# Patient Record
Sex: FEMALE | Race: BLACK OR AFRICAN AMERICAN | Employment: FULL TIME | URBAN - METROPOLITAN AREA
[De-identification: names, ages, dates, MRNs, and addresses within clinical notes are randomized per-mention and may not be internally consistent; named-entity substitution may affect disease eponyms.]

---

## 2017-01-05 ENCOUNTER — HOSPITAL ENCOUNTER (OUTPATIENT)
Dept: MAMMOGRAPHY | Age: 52
Discharge: HOME | End: 2017-01-05
Attending: FAMILY MEDICINE | Admitting: FAMILY MEDICINE

## 2017-01-05 DIAGNOSIS — Z12.31 ENCOUNTER FOR SCREENING MAMMOGRAM FOR BREAST CANCER: ICD-10-CM

## 2017-03-13 LAB
HPV COMMENT: NORMAL
HPV TYPE 16: NOT DETECTED
HPV TYPE 18: NOT DETECTED
HPVOH (OTHER TYPES): NOT DETECTED

## 2018-05-08 ENCOUNTER — HOSPITAL ENCOUNTER (OUTPATIENT)
Dept: ENDOSCOPY | Age: 53
Discharge: HOME | End: 2018-05-08
Attending: SURGERY | Admitting: SURGERY

## 2018-05-08 VITALS
RESPIRATION RATE: 15 BRPM | HEIGHT: 66 IN | DIASTOLIC BLOOD PRESSURE: 89 MMHG | HEART RATE: 95 BPM | WEIGHT: 239 LBS | SYSTOLIC BLOOD PRESSURE: 114 MMHG | OXYGEN SATURATION: 100 % | TEMPERATURE: 98.3 F | BODY MASS INDEX: 38.41 KG/M2

## 2018-05-08 LAB
GLUCOSE BLD-MCNC: 145 MG/DL (ref 70–99)
GLUCOSE BLD-MCNC: 177 MG/DL (ref 70–99)
HCG(URINE) PREGNANCY TEST: NEGATIVE
PERFORMED ON: ABNORMAL
PERFORMED ON: ABNORMAL

## 2018-05-08 RX ORDER — SODIUM CHLORIDE 0.9 % (FLUSH) 0.9 %
10 SYRINGE (ML) INJECTION PRN
Status: DISCONTINUED | OUTPATIENT
Start: 2018-05-08 | End: 2018-05-09 | Stop reason: HOSPADM

## 2018-05-08 RX ORDER — SODIUM CHLORIDE 9 MG/ML
INJECTION, SOLUTION INTRAVENOUS CONTINUOUS
Status: DISCONTINUED | OUTPATIENT
Start: 2018-05-08 | End: 2018-05-09 | Stop reason: HOSPADM

## 2018-05-08 RX ORDER — SODIUM CHLORIDE 0.9 % (FLUSH) 0.9 %
10 SYRINGE (ML) INJECTION EVERY 12 HOURS SCHEDULED
Status: DISCONTINUED | OUTPATIENT
Start: 2018-05-08 | End: 2018-05-09 | Stop reason: HOSPADM

## 2018-05-08 RX ORDER — IBUPROFEN 400 MG/1
400 TABLET ORAL EVERY 6 HOURS PRN
COMMUNITY
End: 2021-07-12

## 2018-05-08 RX ORDER — AMOXICILLIN 500 MG/1
500 CAPSULE ORAL 3 TIMES DAILY
COMMUNITY
End: 2021-07-12

## 2018-05-08 RX ADMIN — SODIUM CHLORIDE: 9 INJECTION, SOLUTION INTRAVENOUS at 11:53

## 2018-05-08 ASSESSMENT — PAIN DESCRIPTION - DESCRIPTORS: DESCRIPTORS: ACHING

## 2018-05-08 ASSESSMENT — ENCOUNTER SYMPTOMS: SHORTNESS OF BREATH: 0

## 2018-05-08 ASSESSMENT — PAIN - FUNCTIONAL ASSESSMENT: PAIN_FUNCTIONAL_ASSESSMENT: 0-10

## 2018-06-28 ENCOUNTER — HOSPITAL ENCOUNTER (OUTPATIENT)
Dept: MAMMOGRAPHY | Age: 53
Discharge: HOME | End: 2018-06-28
Attending: OBSTETRICS & GYNECOLOGY | Admitting: OBSTETRICS & GYNECOLOGY

## 2018-06-28 DIAGNOSIS — Z12.31 ENCOUNTER FOR SCREENING MAMMOGRAM FOR BREAST CANCER: ICD-10-CM

## 2018-07-13 ENCOUNTER — HOSPITAL ENCOUNTER (OUTPATIENT)
Dept: ULTRASOUND IMAGING | Age: 53
Discharge: HOME | End: 2018-07-13
Attending: FAMILY MEDICINE | Admitting: FAMILY MEDICINE

## 2018-07-13 DIAGNOSIS — E04.9 NONTOXIC GOITER: ICD-10-CM

## 2018-07-13 DIAGNOSIS — E04.9 NON-TOXIC GOITER: ICD-10-CM

## 2018-09-05 ENCOUNTER — HOSPITAL ENCOUNTER (OUTPATIENT)
Dept: ULTRASOUND IMAGING | Age: 53
Discharge: HOME | End: 2018-09-05
Admitting: FAMILY MEDICINE

## 2018-09-05 DIAGNOSIS — R10.2 PELVIC AND PERINEAL PAIN: ICD-10-CM

## 2018-09-05 DIAGNOSIS — R10.2 PELVIC PAIN: ICD-10-CM

## 2020-01-31 ENCOUNTER — HOSPITAL ENCOUNTER (OUTPATIENT)
Dept: GENERAL RADIOLOGY | Age: 55
Discharge: HOME | End: 2020-01-31
Payer: COMMERCIAL

## 2020-01-31 PROCEDURE — 74220 X-RAY XM ESOPHAGUS 1CNTRST: CPT

## 2020-11-05 ENCOUNTER — HOSPITAL ENCOUNTER (OUTPATIENT)
Dept: CT IMAGING | Age: 55
Discharge: HOME | End: 2020-11-05
Payer: COMMERCIAL

## 2020-11-05 LAB
CREAT SERPL-MCNC: 0.7 MG/DL (ref 0.6–1.1)
GFR AFRICAN AMERICAN: >60
GFR NON-AFRICAN AMERICAN: >60

## 2020-11-05 PROCEDURE — 36415 COLL VENOUS BLD VENIPUNCTURE: CPT

## 2020-11-05 PROCEDURE — 82565 ASSAY OF CREATININE: CPT

## 2020-11-05 PROCEDURE — 6360000004 HC RX CONTRAST MEDICATION: Performed by: OBSTETRICS & GYNECOLOGY

## 2020-11-05 PROCEDURE — 72193 CT PELVIS W/DYE: CPT

## 2020-11-05 RX ADMIN — IOPAMIDOL 75 ML: 755 INJECTION, SOLUTION INTRAVENOUS at 14:47

## 2020-11-05 RX ADMIN — IOHEXOL 50 ML: 240 INJECTION, SOLUTION INTRATHECAL; INTRAVASCULAR; INTRAVENOUS; ORAL at 14:47

## 2020-11-17 LAB — CA 125: 192.1 U/ML (ref 0–35)

## 2020-11-19 LAB — CEA: 2.3 NG/ML (ref 0–5)

## 2020-12-11 ENCOUNTER — HOSPITAL ENCOUNTER (OUTPATIENT)
Dept: VASCULAR LAB | Age: 55
Discharge: HOME | End: 2020-12-11
Payer: COMMERCIAL

## 2020-12-11 PROCEDURE — 93971 EXTREMITY STUDY: CPT

## 2021-05-04 ENCOUNTER — TELEPHONE (OUTPATIENT)
Dept: ENDOCRINOLOGY | Age: 56
End: 2021-05-04

## 2021-07-12 ENCOUNTER — VIRTUAL VISIT (OUTPATIENT)
Dept: ENDOCRINOLOGY | Age: 56
End: 2021-07-12
Payer: COMMERCIAL

## 2021-07-12 DIAGNOSIS — E11.9 TYPE 2 DIABETES MELLITUS WITHOUT COMPLICATION, WITHOUT LONG-TERM CURRENT USE OF INSULIN (HCC): Primary | ICD-10-CM

## 2021-07-12 DIAGNOSIS — I10 ESSENTIAL HYPERTENSION: ICD-10-CM

## 2021-07-12 DIAGNOSIS — C56.3 MALIGNANT NEOPLASM OF BOTH OVARIES (HCC): ICD-10-CM

## 2021-07-12 DIAGNOSIS — K21.00 GASTROESOPHAGEAL REFLUX DISEASE WITH ESOPHAGITIS WITHOUT HEMORRHAGE: ICD-10-CM

## 2021-07-12 PROCEDURE — 99243 OFF/OP CNSLTJ NEW/EST LOW 30: CPT | Performed by: INTERNAL MEDICINE

## 2021-07-12 PROCEDURE — G8427 DOCREV CUR MEDS BY ELIG CLIN: HCPCS | Performed by: INTERNAL MEDICINE

## 2021-07-12 PROCEDURE — G8421 BMI NOT CALCULATED: HCPCS | Performed by: INTERNAL MEDICINE

## 2021-07-12 PROCEDURE — 2022F DILAT RTA XM EVC RTNOPTHY: CPT | Performed by: INTERNAL MEDICINE

## 2021-07-12 RX ORDER — BLOOD SUGAR DIAGNOSTIC
STRIP MISCELLANEOUS
COMMUNITY
Start: 2021-05-01

## 2021-07-12 RX ORDER — FLUTICASONE PROPIONATE 50 MCG
1 SPRAY, SUSPENSION (ML) NASAL DAILY PRN
COMMUNITY
Start: 2020-10-28 | End: 2022-09-29

## 2021-07-12 NOTE — Clinical Note
Please email ( [email or web address]) and mail her lab orders at schedule for a follow up in 3 months and mail patient  lab orders

## 2021-07-12 NOTE — PROGRESS NOTES
Hamzah Butler is a 54 y.o. female is referred to me by Dr Stephanie Pulido for evaluation and management of uncontrolled Type  2 diabetes. Hamzah Butler was diagnosed with Diabetes mellitus in 2009  . Diabetes was diagnosed at routine screening . Hamzah Butler got diabetic education in the past.  Comorbid conditions: Neuropathy  She has been doing Keto diet. She stopped taking Metformin and Glyburide since she lost 20 lbs from 2020 to 2021     She is also diagnosed with stage 3 Ovarian cancer in 2020 she took Chemo and had hysterectomy and oophorectomy.    She also has been diagnosed with tachycardia   She has some neuropathy symptoms in her left leg   She has sleep Apnea   She has hypertension and is on HCTZ         Past Medical History:   Diagnosis Date    Diabetes mellitus ([address]     GERD (gastroesophageal reflux disease)     Hiatal hernia     Hypertension     Malignant neoplasm of both ovaries ([address] 2021    Morbid obesity with BMI of 40.0-44.9, adult [facility])       Patient Active Problem List   Diagnosis    Diabetes mellitus ([address]    Hiatal hernia    Essential hypertension    Morbid obesity with BMI of 40.0-44.9, adult ([address]    GERD (gastroesophageal reflux disease)    Malignant neoplasm of both ovaries ([address]     Past Surgical History:   Procedure Laterality Date     SECTION      1    CHOLECYSTECTOMY      COLONOSCOPY  2018    Colonoscopy up to cecum with biopsy polypectomy of rectum annd snare polypectomy of hepatic flexure    WISDOM TOOTH EXTRACTION       Social History     Socioeconomic History    Marital status: Single     Spouse name: Not on file    Number of children: Not on file    Years of education: Not on file    Highest education level: Not on file   Occupational History    Not on file   Tobacco Use    Smoking status: Never Smoker    Smokeless tobacco: Never Used   Substance and Sexual Activity    Alcohol use: No     Alcohol/week: 0.0 standard drinks    Drug use: No    Sexual activity: Not on file   Other Topics Concern    Not on file   Social History Narrative    Not on file     Social Determinants of Health     Financial Resource Strain:     Difficulty of Paying Living Expenses:    Food Insecurity:     Worried About Running Out of Food in the Last Year:     [address] in the Last Year:    Transportation Needs:     Lack of Transportation (Medical):  Lack of Transportation (Non-Medical):    Physical Activity:     Days of Exercise per Week:     Minutes of Exercise per Session:    Stress:     Feeling of Stress :    Social Connections:     Frequency of Communication with Friends and Family:     Frequency of Social Gatherings with Friends and Family:     Attends [employer] Services:     Active Member of Clubs or Organizations:     Attends Club or Organization Meetings:     Marital Status:    Intimate Partner Violence:     Fear of Current or Ex-Partner:     Emotionally Abused:     Physically Abused:     Sexually Abused:      Family History   Problem Relation Age of Onset    High Blood Pressure Mother     Heart Disease Father         passed at 61    Cancer Maternal Uncle         breast     Current Outpatient Medications   Medication Sig Dispense Refill    fluticasone (FLONASE) 50 MCG/ACT nasal spray 1 spray by Nasal route daily as needed      ONETOUCH VERIO strip       hydrochlorothiazide (HYDRODIURIL) 25 MG tablet Take 25 mg by mouth daily       lisinopril (PRINIVIL;ZESTRIL) 40 MG tablet Take 40 mg by mouth daily       metFORMIN (GLUCOPHAGE) 1000 MG tablet Take 1,000 mg by mouth 2 times daily (with meals). (Patient not taking: Reported on 7/12/2021)      glyBURIDE (DIABETA) 5 MG tablet Take 5 mg by mouth 2 times daily (with meals). Unsure of dose (Patient not taking: Reported on 7/12/2021)       No current facility-administered medications for this visit.      Allergies   Allergen Reactions    Augmentin [Amoxicillin-Pot Clavulanate]      STOMACH UPSET     Family Status   Relation Name Status    Mother  Alive    Father      MUnc  Alive         OBJECTIVE:  There were no vitals taken for this visit. Wt Readings from Last 3 Encounters:   18 239 lb (108.4 kg)   18 239 lb (108.4 kg)   18 237 lb (107.5 kg)       Constitutional: no acute distress, well appearing and well nourished  Psychiatric: oriented to person, place and time, judgement and insight and normal, recent and remote memory intact and mood and affect are normal  Skin: skin and subcutaneous tissue is normal without visible mass,   Head and Face: visual inspection  of head and face revealed no abnormalities  Eyes: visual inspection showed no lid or conjunctival swelling, erythema or discharge, pupils are normal, equal, round  Ears/Nose: external inspection of ears and nose revealed no abnormalities, hearing is grossly normal  Oropharynx/Mouth/Face: lips, tongue and gums appear  normal with no lesions, the voice quality was normal  Neck: neck appears symmetric, with no visible masses,   Pulmonary: no increased work of breathing or signs of respiratory distress,  Musculoskeletal: normal on inspection    Neurological: normal coordination and normal general cortical function      No results found for: LABA1C      ASSESSMENT/PLAN:    1. Type 2 diabetes mellitus without complication, without long-term current use of insulin       We discussed progression of diabetes in detail and also the incidence of complications associated with uncontrolled diabetes. Gurpreet Gabriel was advised that lifestyle modification is the key to better control of her Diabetes. We discussed carbohydrate restriction in detail.   She has been restricting her Carbs and was able to stop both her oral hypoglycemic agents in 2021 ( she was on metformin and Glyburide )   Metformin also gave her Gastric  SE anyways      Health Maintenance   Last Eye Exam: advised to have annual dilated eye exam. her last eye exam was in July 2021 showed  retinopathy   Last Podiatry Exam:  Last foot exam was with podiatry   Lipids: Last LDL level was not available   Microalbumin/Creatinine Ratio: I have ordered MA with next lab work     . Education: Reviewed ABCs of diabetes management (respective goals in parentheses): A1C (<7), blood pressure (<130/80), and cholesterol (LDL <100). Additional Education: referred to Diabetes Educator.      - Comprehensive Metabolic Panel; Future  - Hemoglobin A1C; Future  - Lipid Panel; Future  - Microalbumin / Creatinine Urine Ratio; Future  - TSH without Reflex; Future  - Comprehensive Metabolic Panel; Future  - Hemoglobin A1C; Future  - Microalbumin / Creatinine Urine Ratio; Future  - TSH without Reflex; Future    2. Essential hypertension  She is on HCTZ     3. Malignant neoplasm of both ovaries [facility])  She is s/p complete hysterectomy   She is on Chemo     4. Gastroesophageal reflux disease with esophagitis without hemorrhage  Stable       Reviewed and/or ordered clinical lab results yes   Reviewed and/or ordered radiology tests Yes  Reviewed and/or ordered other diagnostic tests yes   Made a decision to obtain old records yes   Reviewed and summarized old records yes     Caridad Gonzales was counseled regarding symptoms of current diagnosis, course and complications of disease if inadequately treated, side effects of medications, diagnosis, treatment options, and prognosis, risks, benefits, complications, and alternatives of treatment, labs, imaging and other studies and treatment targets and goals.   She understands instructions and counseling    TELEHEALTH EVALUATION -- Audio/Visual (During XPQQX-22 public health emergency)  Pursuant to the emergency declaration under the [facility], [facility] waiver authority and the [employer] Act, this Virtual  Visit was conducted, with patient's consent, to reduce the patient's risk of exposure to COVID-19 and provide care for  patient. Services were provided through a video synchronous discussion virtually to substitute for in-person clinic visit. Patient's location : home     Patient provided verbal consent to use the video visit. Total time spent : 40 + Reviewing the chart, conducting an interview, performing a limited exam by video and educating the patient on my assessment plan. Return in about 5 months (around 12/12/2021). Please note that some or all of this report was generated using voice recognition software. Please notify me in case of any questions about the content of this document, as some errors in transcription may have occurred .

## 2021-07-15 ENCOUNTER — TELEPHONE (OUTPATIENT)
Dept: ENDOCRINOLOGY | Age: 56
End: 2021-07-15

## 2021-07-15 NOTE — TELEPHONE ENCOUNTER
Pt is still awaiting her lab orders to be emailed her I had routed those on Monday to [place] as pt is wanting to get blood work done and she called back to day for lab orders

## 2021-07-15 NOTE — TELEPHONE ENCOUNTER
Pt phoned having problems with her both legs. She would like a call back. She has a lot of concerns.

## 2021-07-16 ENCOUNTER — TELEPHONE (OUTPATIENT)
Dept: ENDOCRINOLOGY | Age: 56
End: 2021-07-16

## 2021-07-16 NOTE — TELEPHONE ENCOUNTER
Yes , I did email the lab orders & mailed them out to her in the mail as well.  Our mail is picked up by a  and then sent from the home office in [place] so there is a turn around for our mail by the time it reaches their home address

## 2021-07-16 NOTE — TELEPHONE ENCOUNTER
Email from patient:    You guys have my insurance mixed up. My primary insurance is [employer]. My secondary insurance is [employer]. Please update in the computer to ensure proper billing for  payment of claims     Izzy Green can you see if you can fix this?

## 2021-07-16 NOTE — TELEPHONE ENCOUNTER
Poly Garcia fixed her insurance.  Not sure how it got switched but when I registered her I had [place] as Primary and her [facility] Medicaid as 2ndary because all medicaid plans are always 2ndary w another insurance

## 2021-07-17 ENCOUNTER — HOSPITAL ENCOUNTER (OUTPATIENT)
Age: 56
Discharge: HOME | End: 2021-07-17
Payer: COMMERCIAL

## 2021-07-17 DIAGNOSIS — E11.9 TYPE 2 DIABETES MELLITUS WITHOUT COMPLICATION, WITHOUT LONG-TERM CURRENT USE OF INSULIN (HCC): ICD-10-CM

## 2021-07-17 LAB
A/G RATIO: 1.2 (ref 1.1–2.2)
ALBUMIN SERPL-MCNC: 4.3 G/DL (ref 3.4–5)
ALP BLD-CCNC: 83 U/L (ref 40–129)
ALT SERPL-CCNC: 18 U/L (ref 10–40)
ANION GAP SERPL CALCULATED.3IONS-SCNC: 13 MMOL/L (ref 3–16)
AST SERPL-CCNC: 21 U/L (ref 15–37)
BILIRUB SERPL-MCNC: 0.3 MG/DL (ref 0–1)
BUN BLDV-MCNC: 17 MG/DL (ref 7–20)
CALCIUM SERPL-MCNC: 9.6 MG/DL (ref 8.3–10.6)
CHLORIDE BLD-SCNC: 97 MMOL/L (ref 99–110)
CHOLESTEROL, TOTAL: 151 MG/DL (ref 0–199)
CO2: 27 MMOL/L (ref 21–32)
CREAT SERPL-MCNC: 0.7 MG/DL (ref 0.6–1.1)
CREATININE URINE: 54.2 MG/DL (ref 28–259)
GFR AFRICAN AMERICAN: >60
GFR NON-AFRICAN AMERICAN: >60
GLOBULIN: 3.5 G/DL
GLUCOSE BLD-MCNC: 80 MG/DL (ref 70–99)
HDLC SERPL-MCNC: 48 MG/DL (ref 40–60)
LDL CHOLESTEROL CALCULATED: 95 MG/DL
MICROALBUMIN UR-MCNC: <1.2 MG/DL
MICROALBUMIN/CREAT UR-RTO: NORMAL MG/G (ref 0–30)
POTASSIUM SERPL-SCNC: 4 MMOL/L (ref 3.5–5.1)
SODIUM BLD-SCNC: 137 MMOL/L (ref 136–145)
TOTAL PROTEIN: 7.8 G/DL (ref 6.4–8.2)
TRIGL SERPL-MCNC: 38 MG/DL (ref 0–150)
TSH SERPL DL<=0.05 MIU/L-ACNC: 0.46 UIU/ML (ref 0.27–4.2)
VLDLC SERPL CALC-MCNC: 8 MG/DL

## 2021-07-17 PROCEDURE — 83036 HEMOGLOBIN GLYCOSYLATED A1C: CPT

## 2021-07-17 PROCEDURE — 82043 UR ALBUMIN QUANTITATIVE: CPT

## 2021-07-17 PROCEDURE — 82570 ASSAY OF URINE CREATININE: CPT

## 2021-07-17 PROCEDURE — 80061 LIPID PANEL: CPT

## 2021-07-17 PROCEDURE — 80053 COMPREHEN METABOLIC PANEL: CPT

## 2021-07-17 PROCEDURE — 36415 COLL VENOUS BLD VENIPUNCTURE: CPT

## 2021-07-17 PROCEDURE — 84443 ASSAY THYROID STIM HORMONE: CPT

## 2021-07-18 LAB
ESTIMATED AVERAGE GLUCOSE: 131.2 MG/DL
HBA1C MFR BLD: 6.2 %

## 2021-07-21 DIAGNOSIS — E11.9 TYPE 2 DIABETES MELLITUS WITHOUT COMPLICATION, WITHOUT LONG-TERM CURRENT USE OF INSULIN (HCC): Primary | ICD-10-CM

## 2021-09-14 ENCOUNTER — TELEPHONE (OUTPATIENT)
Dept: ENDOCRINOLOGY | Age: 56
End: 2021-09-14

## 2021-10-05 ENCOUNTER — TELEPHONE (OUTPATIENT)
Dept: ENDOCRINOLOGY | Age: 56
End: 2021-10-05

## 2021-10-05 DIAGNOSIS — E04.1 THYROID NODULE: Primary | ICD-10-CM

## 2021-10-05 NOTE — TELEPHONE ENCOUNTER
I do not have her most recent results and have tried to update the chart once results are available I will call her back

## 2021-10-05 NOTE — TELEPHONE ENCOUNTER
Pt is calling she has some questions regarding test results she had done at [facility]

## 2021-10-06 NOTE — TELEPHONE ENCOUNTER
Patient aware. She states she had a doppler done through [facility] that showed thyroid nodules and that is what she meant for you to look at.

## 2021-10-06 NOTE — TELEPHONE ENCOUNTER
Please advise patient I reviewed the report of venous duplex and it seems like they found incidental small thyroid nodules I would like her to get a detailed thyroid ultrasound to better quantify the size of these nodules I have already placed the order she can get it done in the next week or so.

## 2021-10-22 ENCOUNTER — HOSPITAL ENCOUNTER (OUTPATIENT)
Dept: ULTRASOUND IMAGING | Age: 56
Discharge: HOME | End: 2021-10-22
Payer: COMMERCIAL

## 2021-10-22 DIAGNOSIS — E04.1 THYROID NODULE: ICD-10-CM

## 2021-10-22 PROCEDURE — 76536 US EXAM OF HEAD AND NECK: CPT

## 2021-10-28 ENCOUNTER — TELEPHONE (OUTPATIENT)
Dept: ENDOCRINOLOGY | Age: 56
End: 2021-10-28

## 2021-10-28 NOTE — TELEPHONE ENCOUNTER
----- Message from Claudette Bourne MD sent at 10/27/2021  1:46 PM EDT -----  Please advise patient that thyroid ultrasound report was reviewed she has a small cyst in the left thyroid lobe which does not look suspicious I would recommend repeating a thyroid ultrasound in 1 year

## 2021-10-28 NOTE — TELEPHONE ENCOUNTER
Patient had an upper extremity venous duplex scanWhich did not show any evidence of deep vein thrombosis in the left upper extremity  But it did show incidentally thyroid nodules measuring approximately 0.5 cm  Patient underwent neck ultrasound on October 21, 2021 which showed a small cystic nodule in the left thyroid lobe   I called this patient multiple times on her provided number and it kept on going to her voicemail left a detailed message

## 2022-04-22 ENCOUNTER — TELEPHONE (OUTPATIENT)
Dept: ENDOCRINOLOGY | Age: 57
End: 2022-04-22

## 2022-09-26 ENCOUNTER — TELEPHONE (OUTPATIENT)
Dept: ENDOCRINOLOGY | Age: 57
End: 2022-09-26

## 2022-09-26 DIAGNOSIS — E11.9 TYPE 2 DIABETES MELLITUS WITHOUT COMPLICATION, WITHOUT LONG-TERM CURRENT USE OF INSULIN (HCC): Primary | ICD-10-CM

## 2022-09-28 ENCOUNTER — HOSPITAL ENCOUNTER (OUTPATIENT)
Age: 57
Discharge: HOME | End: 2022-09-28
Payer: COMMERCIAL

## 2022-09-28 DIAGNOSIS — E11.9 TYPE 2 DIABETES MELLITUS WITHOUT COMPLICATION, WITHOUT LONG-TERM CURRENT USE OF INSULIN (HCC): ICD-10-CM

## 2022-09-28 LAB
A/G RATIO: 1.1 (ref 1.1–2.2)
ALBUMIN SERPL-MCNC: 3.9 G/DL (ref 3.4–5)
ALP BLD-CCNC: 69 U/L (ref 40–129)
ALT SERPL-CCNC: 33 U/L (ref 10–40)
ANION GAP SERPL CALCULATED.3IONS-SCNC: 13 MMOL/L (ref 3–16)
AST SERPL-CCNC: 34 U/L (ref 15–37)
BILIRUB SERPL-MCNC: 0.3 MG/DL (ref 0–1)
BUN BLDV-MCNC: 31 MG/DL (ref 7–20)
CALCIUM SERPL-MCNC: 9.9 MG/DL (ref 8.3–10.6)
CHLORIDE BLD-SCNC: 98 MMOL/L (ref 99–110)
CHOLESTEROL, TOTAL: 152 MG/DL (ref 0–199)
CO2: 23 MMOL/L (ref 21–32)
CREAT SERPL-MCNC: 0.8 MG/DL (ref 0.6–1.1)
CREATININE URINE: 106.2 MG/DL (ref 28–259)
GFR AFRICAN AMERICAN: >60
GFR NON-AFRICAN AMERICAN: >60
GLUCOSE BLD-MCNC: 90 MG/DL (ref 70–99)
HDLC SERPL-MCNC: 67 MG/DL (ref 40–60)
LDL CHOLESTEROL CALCULATED: 72 MG/DL
MICROALBUMIN UR-MCNC: <1.2 MG/DL
MICROALBUMIN/CREAT UR-RTO: NORMAL MG/G (ref 0–30)
POTASSIUM SERPL-SCNC: 4.3 MMOL/L (ref 3.5–5.1)
SODIUM BLD-SCNC: 134 MMOL/L (ref 136–145)
TOTAL PROTEIN: 7.4 G/DL (ref 6.4–8.2)
TRIGL SERPL-MCNC: 64 MG/DL (ref 0–150)
TSH SERPL DL<=0.05 MIU/L-ACNC: 1.78 UIU/ML (ref 0.27–4.2)
VLDLC SERPL CALC-MCNC: 13 MG/DL

## 2022-09-28 PROCEDURE — 36415 COLL VENOUS BLD VENIPUNCTURE: CPT

## 2022-09-28 PROCEDURE — 82043 UR ALBUMIN QUANTITATIVE: CPT

## 2022-09-28 PROCEDURE — 80053 COMPREHEN METABOLIC PANEL: CPT

## 2022-09-28 PROCEDURE — 82570 ASSAY OF URINE CREATININE: CPT

## 2022-09-28 PROCEDURE — 83036 HEMOGLOBIN GLYCOSYLATED A1C: CPT

## 2022-09-28 PROCEDURE — 80061 LIPID PANEL: CPT

## 2022-09-28 PROCEDURE — 84443 ASSAY THYROID STIM HORMONE: CPT

## 2022-09-29 ENCOUNTER — OFFICE VISIT (OUTPATIENT)
Dept: ENDOCRINOLOGY | Age: 57
End: 2022-09-29
Payer: COMMERCIAL

## 2022-09-29 ENCOUNTER — TELEPHONE (OUTPATIENT)
Dept: ENDOCRINOLOGY | Age: 57
End: 2022-09-29

## 2022-09-29 VITALS
HEART RATE: 101 BPM | BODY MASS INDEX: 34.72 KG/M2 | WEIGHT: 216 LBS | RESPIRATION RATE: 16 BRPM | HEIGHT: 66 IN | DIASTOLIC BLOOD PRESSURE: 91 MMHG | SYSTOLIC BLOOD PRESSURE: 149 MMHG

## 2022-09-29 DIAGNOSIS — I10 ESSENTIAL HYPERTENSION: ICD-10-CM

## 2022-09-29 DIAGNOSIS — C56.3 MALIGNANT NEOPLASM OF BOTH OVARIES (HCC): ICD-10-CM

## 2022-09-29 DIAGNOSIS — E11.9 TYPE 2 DIABETES MELLITUS WITHOUT COMPLICATION, WITHOUT LONG-TERM CURRENT USE OF INSULIN (HCC): Primary | ICD-10-CM

## 2022-09-29 LAB
ESTIMATED AVERAGE GLUCOSE: 125.5 MG/DL
HBA1C MFR BLD: 6 %

## 2022-09-29 PROCEDURE — 1036F TOBACCO NON-USER: CPT | Performed by: INTERNAL MEDICINE

## 2022-09-29 PROCEDURE — G8427 DOCREV CUR MEDS BY ELIG CLIN: HCPCS | Performed by: INTERNAL MEDICINE

## 2022-09-29 PROCEDURE — 2022F DILAT RTA XM EVC RTNOPTHY: CPT | Performed by: INTERNAL MEDICINE

## 2022-09-29 PROCEDURE — 3044F HG A1C LEVEL LT 7.0%: CPT | Performed by: INTERNAL MEDICINE

## 2022-09-29 PROCEDURE — 3017F COLORECTAL CA SCREEN DOC REV: CPT | Performed by: INTERNAL MEDICINE

## 2022-09-29 PROCEDURE — 99213 OFFICE O/P EST LOW 20 MIN: CPT | Performed by: INTERNAL MEDICINE

## 2022-09-29 PROCEDURE — G8417 CALC BMI ABV UP PARAM F/U: HCPCS | Performed by: INTERNAL MEDICINE

## 2022-09-29 NOTE — TELEPHONE ENCOUNTER
Pt is asking for any info, booklets, pamphlets regarding diabetes education since her new pt appt is not until December giuliana/ David Okeefe    Pt stopped at  to ask.  Told pt we can mail out the info to her

## 2022-09-29 NOTE — PROGRESS NOTES
Garth Lindsay is a 62 y.o. female is seen for the management of diet-controlled type  2 diabetes. Garth Lindsay was diagnosed with Diabetes mellitus in 2009  . Diabetes was diagnosed at routine screening . Garth Lindsay got diabetic education in the past.  Comorbid conditions: Neuropathy  She has been doing Keto diet. She stopped taking Metformin and Glyburide since she lost 20 lbs from 2020 to 2021     She is also diagnosed with stage 3 Ovarian cancer in 2020 she took Chemo and had hysterectomy and oophorectomy. She also has been diagnosed with tachycardia   She has some neuropathy symptoms in her left leg   She has sleep Apnea   She has hypertension and is on HCTZ     INTERIM   She has been watching her diet . Also getting chemo for ovarian cancer .         Past Medical History:   Diagnosis Date    Diabetes mellitus ([facility]     GERD (gastroesophageal reflux disease)     Hiatal hernia     Hypertension     Malignant neoplasm of both ovaries 2021    Morbid obesity with BMI of 40.0-44.9, adult [facility])       Patient Active Problem List   Diagnosis    Diabetes mellitus ([facility]    Hiatal hernia    Essential hypertension    Morbid obesity with BMI of 40.0-44.9, adult ([facility])    GERD (gastroesophageal reflux disease)    Malignant neoplasm of both ovaries     Past Surgical History:   Procedure Laterality Date     SECTION      1    CHOLECYSTECTOMY      COLONOSCOPY  2018    Colonoscopy up to cecum with biopsy polypectomy of rectum annd snare polypectomy of hepatic flexure    WISDOM TOOTH EXTRACTION       Social History     Socioeconomic History    Marital status: Single     Spouse name: Not on file    Number of children: Not on file    Years of education: Not on file    Highest education level: Not on file   Occupational History    Not on file   Tobacco Use    Smoking status: Never    Smokeless tobacco: Never   Substance and Sexual Activity    Alcohol use: No     Alcohol/week: 0.0 standard drinks    Drug use: No    Sexual activity: Not on file   Other Topics Concern    Not on file   Social History Narrative    Not on file     Social Determinants of Health     Financial Resource Strain: Not on file   Food Insecurity: Not on file   Transportation Needs: Not on file   Physical Activity: Not on file   Stress: Not on file   Social Connections: Not on file   Intimate Partner Violence: Not on file   Housing Stability: Not on file     Family History   Problem Relation Age of Onset    High Blood Pressure Mother     Heart Disease Father         passed at 61    Cancer Maternal Uncle         breast     Current Outpatient Medications   Medication Sig Dispense Refill    ONETOUCH VERIO strip       hydrochlorothiazide (HYDRODIURIL) 25 MG tablet Take 25 mg by mouth daily       lisinopril (PRINIVIL;ZESTRIL) 40 MG tablet Take 40 mg by mouth daily        No current facility-administered medications for this visit. Allergies   Allergen Reactions    Augmentin [Amoxicillin-Pot Clavulanate]      STOMACH UPSET     Family Status   Relation Name Status    Mother  Alive    Father      MUnc  Alive     ROS  I have reviewed the review of system questionnaire filled by the patient .   Patient was advised to contact PCP for non endocrine signs and symptoms       OBJECTIVE:  BP (!) 149/91   Pulse (!) 101   Resp 16   Ht 5' 5.5\" (1.664 m)   Wt 216 lb (98 kg)   BMI 35.40 kg/m²    Wt Readings from Last 3 Encounters:   22 216 lb (98 kg)   18 239 lb (108.4 kg)   18 239 lb (108.4 kg)       Constitutional: no acute distress, well appearing and well nourished  Psychiatric: oriented to person, place and time, judgement and insight and normal, recent and remote memory intact and mood and affect are normal  Skin: skin and subcutaneous tissue is normal without visible mass,   Head and Face: visual inspection  of head and face revealed no abnormalities  Eyes: visual inspection showed no lid or conjunctival swelling, erythema or discharge, pupils are normal, equal, round  Ears/Nose: external inspection of ears and nose revealed no abnormalities, hearing is grossly normal  Oropharynx/Mouth/Face: lips, tongue and gums appear  normal with no lesions, the voice quality was normal  Neck: neck appears symmetric, with no visible masses,   Pulmonary: no increased work of breathing or signs of respiratory distress,  Musculoskeletal: normal on inspection    Neurological: normal coordination and normal general cortical function      Lab Results   Component Value Date/Time    LABA1C 6.0 09/28/2022 11:55 AM    LABA1C 6.2 07/17/2021 11:50 AM         ASSESSMENT/PLAN:    1. Type 2 diabetes mellitus without complication, without long-term current use of insulin     Not on meds   Aic 6.0  She has been restricting her Carbs and was able to stop both her oral hypoglycemic agents in march 2021 ( she was on metformin and Glyburide )   Metformin also gave her Gastric  SE anyways      Health Maintenance   Last Eye Exam: advised to have annual dilated eye exam. her last eye exam was in July 2021 showed  retinopathy s/p laser   Last Podiatry Exam:  Last foot exam was with podiatry   Lipids: Last LDL was within normal limits  Microalbumin/Creatinine Ratio: I have ordered MA with next lab work       2. Essential hypertension  She is on HCTZ. Blood pressure control at home is much better she will check at home and send the log to her new primary care physician if this needs adjustment  3. Malignant neoplasm of both ovaries [facility])  She is s/p complete hysterectomy   She is on Chemo as per [facility]     4.  Gastroesophageal reflux disease with esophagitis without hemorrhage  Stable       Reviewed and/or ordered clinical lab results yes   Reviewed and/or ordered radiology tests Yes  Reviewed and/or ordered other diagnostic tests yes   Made a decision to obtain old records yes   Reviewed and summarized old records yes     Samuel Escobar was counseled regarding symptoms of current diagnosis, course and complications of disease if inadequately treated, side effects of medications, diagnosis, treatment options, and prognosis, risks, benefits, complications, and alternatives of treatment, labs, imaging and other studies and treatment targets and goals. She understands instructions and counseling            Return in about 6 months (around 3/29/2023). Please note that some or all of this report was generated using voice recognition software. Please notify me in case of any questions about the content of this document, as some errors in transcription may have occurred .

## 2022-09-30 ENCOUNTER — TELEPHONE (OUTPATIENT)
Dept: ENDOCRINOLOGY | Age: 57
End: 2022-09-30

## 2022-09-30 NOTE — TELEPHONE ENCOUNTER
Called patient to reschedule New patient to an earlier time. She is scheduled 10/6/2022  In the [place] office.

## 2022-10-06 ENCOUNTER — TELEMEDICINE (OUTPATIENT)
Dept: ENDOCRINOLOGY | Age: 57
End: 2022-10-06
Payer: COMMERCIAL

## 2022-10-06 DIAGNOSIS — E11.9 TYPE 2 DIABETES MELLITUS WITHOUT COMPLICATION, WITHOUT LONG-TERM CURRENT USE OF INSULIN (HCC): Primary | ICD-10-CM

## 2022-10-06 PROCEDURE — G8417 CALC BMI ABV UP PARAM F/U: HCPCS | Performed by: DIETITIAN, REGISTERED

## 2022-10-06 PROCEDURE — 97802 MEDICAL NUTRITION INDIV IN: CPT | Performed by: DIETITIAN, REGISTERED

## 2022-10-06 PROCEDURE — 3044F HG A1C LEVEL LT 7.0%: CPT | Performed by: DIETITIAN, REGISTERED

## 2022-10-06 PROCEDURE — G8428 CUR MEDS NOT DOCUMENT: HCPCS | Performed by: DIETITIAN, REGISTERED

## 2022-10-06 NOTE — PROGRESS NOTES
Medical Nutrition Therapy for Diabetes    Rogers Randall  October 6, 2022      Patient Care Team:  Eduardo Garcia MD as PCP - [address],  as Obstetrician (Obstetrics & Gynecology)  Wetzel Schilder, MD as Consulting Physician (General Surgery)    Reason for visit: Type 2 Diabetes    ASSESSMENT/PLAN:   NUTRITION DIAGNOSIS  Initial Visit    Identified patient by name and date of birth. Patient is meeting this Virtual appointment at home. I am at [facility] office. Patient is the sole participant on the video call. Pursuant to the emergency declaration under the [facility], [facility] waiver authority and the [employer] and [employer] Act this Virtual Visit was insisted, with patient's consent, to reduce the patient's risk of exposure to COVID-19 and provide continuity of care for an established patient. #1 Problem: Altered Nutrition-Related Laboratory Values (NC-2.2)  Related to: Endocrine/Diabetes   As Evidenced by: Elevated Plasma glucose and/or HgbA1c levels          #2 Problem: Knowledge and Beliefs-NB-3.1                       Food and nutrition deficits     #3 Problem: Inadequate Carbohydrate Intake  Related to: food and nutrition knowledge deficit regarding controlling blood glucose  As evidenced by: food recall with less than 15 g carbohydrate per meal.     NUTRITION INTERVENTION  Nutrition Prescription: 30 grams carbohydrate per meal with protein and non-starch vegetables  15 gram carbohydrate snacks     Diabetes Education/Counseling included:  Carbohydrate Control    Interventions:  Control Carbohydrate Intake using Plate Guide  Encouraged 3 meals and 3 snacks. Space meals 4-5 hours apart and snacks between. Recommended including protein with meals and snacks.   Handouts: Healthy Eating Guidelines for Diabetes-[facility], [facility] DEPARTMENT OF CORRECTION - [facility] INPATIENT CARE FACILITY Health, Planning Healthy Meals-[facility] NUTRITION MONITORING AND EVALUATION  3 meals/3 snacks  30 gram carbohydrate meals with protein  Continue good water intake       Patient Active Problem List   Diagnosis    Diabetes mellitus ([facility] [address]    Hiatal hernia    Essential hypertension    Morbid obesity with BMI of 40.0-44.9, adult (HCC)    GERD (gastroesophageal reflux disease)    Malignant neoplasm of both ovaries (HCC)       Current Outpatient Medications   Medication Sig Dispense Refill    ONETOUCH VERIO strip       hydrochlorothiazide (HYDRODIURIL) 25 MG tablet Take 25 mg by mouth daily       lisinopril (PRINIVIL;ZESTRIL) 40 MG tablet Take 40 mg by mouth daily        No current facility-administered medications for this visit.          NUTRITION ASSESSMENT    Biochemical Data:    Lab Results   Component Value Date    LABA1C 6.0 09/28/2022     Lab Results   Component Value Date    .5 09/28/2022       Lab Results   Component Value Date    CHOL 152 09/28/2022    CHOL 151 07/17/2021     Lab Results   Component Value Date    TRIG 64 09/28/2022    TRIG 38 07/17/2021     Lab Results   Component Value Date    HDL 67 (H) 09/28/2022    HDL 48 07/17/2021     Lab Results   Component Value Date    LDLCALC 72 09/28/2022    [facility] 95 07/17/2021     Lab Results   Component Value Date    LABVLDL 13 09/28/2022    LABVLDL 8 07/17/2021     No results found for: [facility]    Lab Results   Component Value Date    WBC 11.0 09/04/2018    HGB 14.5 09/04/2018    HCT 44.0 09/04/2018    MCV 81.1 09/04/2018     09/04/2018       Lab Results   Component Value Date    CREATININE 0.8 09/28/2022    BUN 31 (H) 09/28/2022     (L) 09/28/2022    K 4.3 09/28/2022    CL 98 (L) 09/28/2022    CO2 23 09/28/2022       Diabetes Medications: No  Knows name and dose of prescribed medications   Knows prescribed schedule for medications  Recent change in medication type/dosage:   Stores  medications properly  Comments:     Monitoring:   Has BG meter: not answered  Testing frequency:   Recent results:   Hypoglycemia? Anthropometric Measurements: Wt:   Wt Readings from Last 3 Encounters:   09/29/22 216 lb (98 kg)   09/04/18 239 lb (108.4 kg)   05/08/18 239 lb (108.4 kg)      BMI:   BMI Readings from Last 3 Encounters:   09/29/22 35.40 kg/m²   09/04/18 39.17 kg/m²   05/08/18 39.17 kg/m²     Patient's stated goal weight:   7% Weight loss goal weight:     Physical Activity History:   Physical activity: walking  Frequency of activity: work involves walking/standing most of the time  Duration of activity: 6-8 hours  Obstacles to activity: days with chemo    Sleep: not answered    Food and Nutrition History:   Nutrition Awareness/Previous DSMES: No  Number of people in household: 1  Frequency of Meals Eaten away from home:not answered  Food Availability Problems  Within the past 12 months, have you worried that your food would run out before you got money to buy more? Yes  Within the past 12 months, has the food you bought not lasted till the end of the month and you didn't have money to get more? Yes  Beverage consumption: water at least 64 ounces / day,  try to achieve 80 ounces daily,   Alcohol consumption: No  Usual Food consumption:   \"I like to eat on an intermittent fast schedule. \"  10:30 - 6:30  10:30 omelette pre-prepared, berries   Additional meals and snacks not revealed  Barriers:   -none          Follow Up Plan: 4-6 weeks Will remain available. Contact information given.      Referring Provider: Clement Bradshaw MD  Time spent with patient: 30 minutes

## 2022-10-11 ENCOUNTER — TELEPHONE (OUTPATIENT)
Dept: ENDOCRINOLOGY | Age: 57
End: 2022-10-11

## 2022-10-11 NOTE — TELEPHONE ENCOUNTER
Pt calling and following up if the diabetes educator sent her email w/ info. Pt states she never did receive a email     Email: [email or web address]

## 2022-10-19 ENCOUNTER — TELEPHONE (OUTPATIENT)
Dept: ENDOCRINOLOGY | Age: 57
End: 2022-10-19

## 2022-10-19 DIAGNOSIS — I10 ESSENTIAL HYPERTENSION: Primary | ICD-10-CM

## 2022-10-19 RX ORDER — AMLODIPINE BESYLATE 5 MG/1
5 TABLET ORAL DAILY
Qty: 30 TABLET | Refills: 5 | Status: SHIPPED | OUTPATIENT
Start: 2022-10-19 | End: 2022-10-20 | Stop reason: SDUPTHER

## 2022-10-19 NOTE — TELEPHONE ENCOUNTER
Call from patient wanting to know if Dr. Bertell Curling would prescribe her medication for her BP   She is has expressed that she is not very trusting of her current PCP.  She states that her previous PCP has retired and she is schedule for a NP appt to transition to a new PCP but she cannot see the PCP until November  She stated that she is currently on chemo and she's afraid that her current PCP does not know how to properly treat her     She is requesting to speak with Dr. Bertell Curling to discuss her BP      Please advise   CB# [phone]

## 2022-10-19 NOTE — TELEPHONE ENCOUNTER
Called patient and left a message that I will add amlodipine 5 mg to her current blood pressure medication regimen of lisinopril and hydrochlorothiazide and if the patient wants I can switch her to lisinopril hydrochlorothiazide combination so she does not have to take 2 separate pill but currently I am sending a prescription for amlodipine 5 mg daily in addition to her current blood pressure medication.

## 2022-10-20 RX ORDER — AMLODIPINE BESYLATE 5 MG/1
5 TABLET ORAL DAILY
Qty: 30 TABLET | Refills: 5 | Status: SHIPPED | OUTPATIENT
Start: 2022-10-20 | End: 2022-11-02

## 2022-10-20 NOTE — TELEPHONE ENCOUNTER
Please advise patient if she wants to reduce the dose of hydrochlorothiazide to half a tablet daily she can do that and take the new blood pressure medicine along with lisinopril and send me the actual blood pressure readings for the next week or so if the numbers are good on this regimen then I can send a prescription for a reduced dose of hydrochlorothiazide eventually

## 2022-10-20 NOTE — TELEPHONE ENCOUNTER
Pt calling and states she is worried about the Hydrochlorothiazide making her dehydrated so she is wondering if Dr Marlon Finnegan can cut her dose in 1/2    She also states the Amlodipine rx should have went to [facility] on [address] in [address]    # [phone]

## 2022-10-24 ENCOUNTER — TELEPHONE (OUTPATIENT)
Dept: ENDOCRINOLOGY | Age: 57
End: 2022-10-24

## 2022-10-24 DIAGNOSIS — I10 ESSENTIAL HYPERTENSION: Primary | ICD-10-CM

## 2022-10-24 RX ORDER — HYDROCHLOROTHIAZIDE 25 MG/1
25 TABLET ORAL DAILY
Qty: 30 TABLET | Refills: 3 | Status: SHIPPED | OUTPATIENT
Start: 2022-10-24

## 2022-10-24 RX ORDER — LISINOPRIL 40 MG/1
40 TABLET ORAL DAILY
Qty: 30 TABLET | Refills: 3 | Status: SHIPPED | OUTPATIENT
Start: 2022-10-24

## 2022-10-24 NOTE — TELEPHONE ENCOUNTER
Pt calling and states she needs refill on her Lisinopril 40mg (she has 19 pills left) and refill on her Hydrochlorothiazide because she is in the middle of switching Dr's.  Send to [facility] on [address] in [facility]# [phone]

## 2022-10-25 ENCOUNTER — TELEPHONE (OUTPATIENT)
Dept: ENDOCRINOLOGY | Age: 57
End: 2022-10-25

## 2022-10-25 ENCOUNTER — TELEMEDICINE (OUTPATIENT)
Dept: ENDOCRINOLOGY | Age: 57
End: 2022-10-25
Payer: COMMERCIAL

## 2022-10-25 DIAGNOSIS — E11.9 TYPE 2 DIABETES MELLITUS WITHOUT COMPLICATION, WITHOUT LONG-TERM CURRENT USE OF INSULIN (HCC): Primary | ICD-10-CM

## 2022-10-25 PROCEDURE — 3044F HG A1C LEVEL LT 7.0%: CPT | Performed by: DIETITIAN, REGISTERED

## 2022-10-25 PROCEDURE — G8428 CUR MEDS NOT DOCUMENT: HCPCS | Performed by: DIETITIAN, REGISTERED

## 2022-10-25 PROCEDURE — 97803 MED NUTRITION INDIV SUBSEQ: CPT | Performed by: DIETITIAN, REGISTERED

## 2022-10-25 PROCEDURE — G8417 CALC BMI ABV UP PARAM F/U: HCPCS | Performed by: DIETITIAN, REGISTERED

## 2022-10-25 NOTE — LETTER
[facility]  Phone: [phone]  Fax: [phone]    Camilla Bustos RD, LD    October 25, 2022     Cathryne Hamman, [address] [address]    Patient: Lana Velasco   MR Number: [id number]   YOB: 1965   Date of Visit: 10/25/2022       Dear Cathryne Hamman: Thank you for referring Prabhakar Cook to me for evaluation/treatment. Below are the relevant portions of my assessment and plan of care. If you have questions, please do not hesitate to call me. I look forward to following Travon Cason along with you.     Sincerely,      Gianfranco Oliveira RD, LD

## 2022-10-25 NOTE — TELEPHONE ENCOUNTER
Pt calling and states she would like to talk to the Dr regarding her labs that she did today through [facility]. The labs were ordered by [facility] due to her having chemo last week.  She states she's trying to be a better person for her health and eating habits by eating more fruits and just wants to be proactive    Pt states she also would like to talk to Dr about blood pressure    # [phone]

## 2022-10-25 NOTE — PROGRESS NOTES
Medical Nutrition Therapy for Diabetes    Tanner Hutson  October 25, 2022      Patient Care Team:  Kita Hu MD as PCP - [address],  as Obstetrician (Obstetrics & Gynecology)  Cindi Primrose, MD as Consulting Physician (General Surgery)    Reason for visit: Type 2 Diabetes    ASSESSMENT/PLAN:   NUTRITION DIAGNOSIS  Follow up     Identified patient by name and date of birth. Patient is meeting this Virtual appointment at home. I am at [facility] office. Patient is the sole participant on the video call. Pursuant to the emergency declaration under the [facility], [facility] waiver authority and the [employer] and [employer] Act this Virtual Visit was insisted, with patient's consent, to reduce the patient's risk of exposure to COVID-19 and provide continuity of care for an established patient. #1 Problem: Altered Nutrition-Related Laboratory Values (NC-2.2)  Related to: Endocrine/Diabetes   As Evidenced by: Elevated Plasma glucose and/or HgbA1c levels     #2 Problem: Knowledge and Beliefs-NB-3.1                       Food and nutrition deficits      #3 Problem: Inadequate Carbohydrate Intake  Related to: food and nutrition knowledge deficit regarding controlling blood glucose  As evidenced by: food recall with less than 15 g carbohydrate per meal.       NUTRITION INTERVENTION  Nutrition Prescription: 30 grams carbohydrate per meal with protein and non-starch vegetables  15 gram carbohydrate snacks     Diabetes Education/Counseling included:  Reviewed  benefits of 3 meals and portion limitation of carbohydrate. Congratulated for good fasting glucose results. Interventions:  Control Carbohydrate Intake using Plate Guide and Control Carbohydrate Intake using Carb Counting  Encouraged 3 meals and snacks as needed.     NUTRITION MONITORING AND EVALUATION  3 meals/snacks  30 gram carbohydrate meals   Continue adequate water       Patient Active Problem List   Diagnosis    Diabetes mellitus ([facility] [address]    Hiatal hernia    Essential hypertension    Morbid obesity with BMI of 40.0-44.9, adult (HCC)    GERD (gastroesophageal reflux disease)    Malignant neoplasm of both ovaries (HCC)       Current Outpatient Medications   Medication Sig Dispense Refill    lisinopril (PRINIVIL;ZESTRIL) 40 MG tablet Take 1 tablet by mouth daily 30 tablet 3    hydroCHLOROthiazide (HYDRODIURIL) 25 MG tablet Take 1 tablet by mouth daily 30 tablet 3    amLODIPine (NORVASC) 5 MG tablet Take 1 tablet by mouth daily 30 tablet 5    ONETOUCH VERIO strip        No current facility-administered medications for this visit.          NUTRITION ASSESSMENT    Biochemical Data:    Lab Results   Component Value Date    LABA1C 6.0 09/28/2022     Lab Results   Component Value Date    .5 09/28/2022       Lab Results   Component Value Date    CHOL 152 09/28/2022    CHOL 151 07/17/2021     Lab Results   Component Value Date    TRIG 64 09/28/2022    TRIG 38 07/17/2021     Lab Results   Component Value Date    HDL 67 (H) 09/28/2022    HDL 48 07/17/2021     Lab Results   Component Value Date    LDLCALC 72 09/28/2022    [address] 07/17/2021     Lab Results   Component Value Date    LABVLDL 13 09/28/2022    LABVLDL 8 07/17/2021     No results found for: [facility]    Lab Results   Component Value Date    WBC 11.0 09/04/2018    HGB 14.5 09/04/2018    HCT 44.0 09/04/2018    MCV 81.1 09/04/2018     09/04/2018       Lab Results   Component Value Date    CREATININE 0.8 09/28/2022    BUN 31 (H) 09/28/2022     (L) 09/28/2022    K 4.3 09/28/2022    CL 98 (L) 09/28/2022    CO2 23 09/28/2022       Diabetes Medications: No  Knows name and dose of prescribed medications   Knows prescribed schedule for medications  Recent change in medication type/dosage:   Stores  medications properly  Comments:     Monitoring: Has BG meter: Yes  Testing frequency: fasting   Recent results: 80-90  Hypoglycemia? No    Anthropometric Measurements: Wt:   Wt Readings from Last 3 Encounters:   09/29/22 216 lb (98 kg)   09/04/18 239 lb (108.4 kg)   05/08/18 239 lb (108.4 kg)      BMI:   BMI Readings from Last 3 Encounters:   09/29/22 35.40 kg/m²   09/04/18 39.17 kg/m²   05/08/18 39.17 kg/m²     Patient's stated goal weight:   7% Weight loss goal weight:     Physical Activity History:   Physical activity: walking  Frequency of activity: most of a work shift  Duration of activity: 5-7 hours  Obstacles to activity: chemo days    Sleep: not answered    Food and Nutrition History:   Nutrition Awareness/Previous DSMES: No  Number of people in household: 1  Frequency of Meals Eaten away from home:not addressed  Food Availability Problems  Within the past 12 months, have you worried that your food would run out before you got money to buy more? Yes  Within the past 12 months, has the food you bought not lasted till the end of the month and you didn't have money to get more? Yes  Beverage consumption: water - 64-80 ounces  Alcohol consumption: No  Usual Food consumption:   3 meals and 2 snacks   Breakfast burrito, yogurt today    Barriers:   -none          Follow Up Plan: 2 months Will remain available. Contact information given.      Referring Provider: Georgina Carvajal MD  Time spent with patient: 15 minutes

## 2022-10-26 NOTE — TELEPHONE ENCOUNTER
Please schedule patient a follow-up visit so that we can discuss new problems that were not discussed at the last office visit since patient wants me to take care of her high blood pressure and other issues as well

## 2022-10-31 ENCOUNTER — TELEPHONE (OUTPATIENT)
Dept: ENDOCRINOLOGY | Age: 57
End: 2022-10-31

## 2022-10-31 NOTE — TELEPHONE ENCOUNTER
Pt calling and states that her heart rate increased since been taking Amlodipine & magnesium. Yesterday she had a pain in her left arm.  Her blood pressure has been high she 175/103 yesterday @ 1016am    #[phone]

## 2022-11-02 ENCOUNTER — OFFICE VISIT (OUTPATIENT)
Dept: ENDOCRINOLOGY | Age: 57
End: 2022-11-02
Payer: COMMERCIAL

## 2022-11-02 VITALS
RESPIRATION RATE: 16 BRPM | WEIGHT: 219 LBS | HEART RATE: 104 BPM | DIASTOLIC BLOOD PRESSURE: 83 MMHG | BODY MASS INDEX: 35.2 KG/M2 | HEIGHT: 66 IN | SYSTOLIC BLOOD PRESSURE: 146 MMHG

## 2022-11-02 DIAGNOSIS — C56.3 MALIGNANT NEOPLASM OF BOTH OVARIES (HCC): ICD-10-CM

## 2022-11-02 DIAGNOSIS — E11.9 TYPE 2 DIABETES MELLITUS WITHOUT COMPLICATION, WITHOUT LONG-TERM CURRENT USE OF INSULIN (HCC): ICD-10-CM

## 2022-11-02 DIAGNOSIS — I10 ESSENTIAL HYPERTENSION: Primary | ICD-10-CM

## 2022-11-02 DIAGNOSIS — K44.9 HIATAL HERNIA: ICD-10-CM

## 2022-11-02 PROCEDURE — G8417 CALC BMI ABV UP PARAM F/U: HCPCS | Performed by: INTERNAL MEDICINE

## 2022-11-02 PROCEDURE — 3017F COLORECTAL CA SCREEN DOC REV: CPT | Performed by: INTERNAL MEDICINE

## 2022-11-02 PROCEDURE — 99214 OFFICE O/P EST MOD 30 MIN: CPT | Performed by: INTERNAL MEDICINE

## 2022-11-02 PROCEDURE — 3074F SYST BP LT 130 MM HG: CPT | Performed by: INTERNAL MEDICINE

## 2022-11-02 PROCEDURE — 1036F TOBACCO NON-USER: CPT | Performed by: INTERNAL MEDICINE

## 2022-11-02 PROCEDURE — G8484 FLU IMMUNIZE NO ADMIN: HCPCS | Performed by: INTERNAL MEDICINE

## 2022-11-02 PROCEDURE — G8427 DOCREV CUR MEDS BY ELIG CLIN: HCPCS | Performed by: INTERNAL MEDICINE

## 2022-11-02 PROCEDURE — 2022F DILAT RTA XM EVC RTNOPTHY: CPT | Performed by: INTERNAL MEDICINE

## 2022-11-02 PROCEDURE — 3078F DIAST BP <80 MM HG: CPT | Performed by: INTERNAL MEDICINE

## 2022-11-02 PROCEDURE — 3044F HG A1C LEVEL LT 7.0%: CPT | Performed by: INTERNAL MEDICINE

## 2022-11-02 RX ORDER — AMLODIPINE BESYLATE 10 MG/1
10 TABLET ORAL DAILY
Qty: 90 TABLET | Refills: 1 | Status: SHIPPED | OUTPATIENT
Start: 2022-11-02

## 2022-11-02 RX ORDER — VITAMIN B COMPLEX
1 CAPSULE ORAL DAILY
COMMUNITY

## 2022-11-02 RX ORDER — MAGNESIUM GLUCONATE 27 MG(500)
TABLET ORAL
COMMUNITY
Start: 2022-10-28

## 2022-11-02 RX ORDER — MULTIVIT-MIN/IRON/FOLIC ACID/K 18-600-40
CAPSULE ORAL
COMMUNITY

## 2022-11-02 NOTE — PROGRESS NOTES
Altagracia Morris is a 62 y.o. female is seen for the management of diet-controlled type  2 diabetes and hypertension. Altagracia Morris was diagnosed with Diabetes mellitus in 2009  . Diabetes was diagnosed at routine screening . Altagracia Morris got diabetic education in the past.  Comorbid conditions: Neuropathy  She has been doing Keto diet. She stopped taking Metformin and Glyburide since she lost 20 lbs from 2020 to 2021     She is also diagnosed with stage 3 Ovarian cancer in 2020 she took Chemo and had hysterectomy and oophorectomy. She also has been diagnosed with tachycardia   She has some neuropathy symptoms in her left leg   She has sleep Apnea   She has hypertension and is on HCTZ     INTERIM   BP running high, she has been under a lot of stress and has been reading stuff online which caused her to be more confused and more stressed out about her medical conditions she is noticing blood pressure running high at her home instrument denies any chest pain shortness of breath or headache  She has started taking amlodipine 5 mg daily in addition to her other blood pressure medication increase amlodipine to 10 mg daily  Sent a prescription for blood pressure cuff  Also getting chemo for ovarian cancer .         Past Medical History:   Diagnosis Date    Diabetes mellitus ([facility] [address]     GERD (gastroesophageal reflux disease)     Hiatal hernia     Hypertension     Malignant neoplasm of both ovaries ([facility] [address] 2021    Morbid obesity with BMI of 40.0-44.9, adult [facility])       Patient Active Problem List   Diagnosis    Diabetes mellitus ([facility] [address]    Hiatal hernia    Essential hypertension    Morbid obesity with BMI of 40.0-44.9, adult (HCC)    GERD (gastroesophageal reflux disease)    Malignant neoplasm of both ovaries ([facility] [address]     Past Surgical History:   Procedure Laterality Date     SECTION      1    CHOLECYSTECTOMY      COLONOSCOPY  2018    Colonoscopy up to cecum with biopsy polypectomy of rectum annd snare polypectomy of hepatic flexure    WISDOM TOOTH EXTRACTION       Social History     Socioeconomic History    Marital status: Single     Spouse name: Not on file    Number of children: Not on file    Years of education: Not on file    Highest education level: Not on file   Occupational History    Not on file   Tobacco Use    Smoking status: Never    Smokeless tobacco: Never   Substance and Sexual Activity    Alcohol use: No     Alcohol/week: 0.0 standard drinks    Drug use: No    Sexual activity: Not on file   Other Topics Concern    Not on file   Social History Narrative    Not on file     Social Determinants of Health     Financial Resource Strain: Not on file   Food Insecurity: Not on file   Transportation Needs: Not on file   Physical Activity: Not on file   Stress: Not on file   Social Connections: Not on file   Intimate Partner Violence: Not on file   Housing Stability: Not on file     Family History   Problem Relation Age of Onset    High Blood Pressure Mother     Heart Disease Father         passed at 61    Cancer Maternal Uncle         breast     Current Outpatient Medications   Medication Sig Dispense Refill    magnesium gluconate (MAGONATE) 500 MG tablet 2 tabs twice a day      b complex vitamins capsule Take 1 capsule by mouth daily      Cholecalciferol (VITAMIN D) 50 MCG (2000 UT) CAPS capsule Take by mouth      Blood Pressure Monitoring (COMFORT TOUCH BP CUFF/MEDIUM) MISC Check BP daily 1 each 0    amLODIPine (NORVASC) 10 MG tablet Take 1 tablet by mouth daily 90 tablet 1    lisinopril (PRINIVIL;ZESTRIL) 40 MG tablet Take 1 tablet by mouth daily 30 tablet 3    hydroCHLOROthiazide (HYDRODIURIL) 25 MG tablet Take 1 tablet by mouth daily 30 tablet 3    ONETOUCH VERIO strip        No current facility-administered medications for this visit.      Allergies   Allergen Reactions    Augmentin [Amoxicillin-Pot Clavulanate]      STOMACH UPSET     Family Status   Relation Name Status    Mother Alive    Father      MUnc  Alive     ROS  I have reviewed the review of system questionnaire filled by the patient . Patient was advised to contact PCP for non endocrine signs and symptoms       OBJECTIVE:  BP (!) 146/83   Pulse (!) 104   Resp 16   Ht 5' 5.5\" (1.664 m)   Wt 219 lb (99.3 kg)   BMI 35.89 kg/m²    Wt Readings from Last 3 Encounters:   22 219 lb (99.3 kg)   22 216 lb (98 kg)   18 239 lb (108.4 kg)       Constitutional: no acute distress, well appearing and well nourished  Psychiatric: oriented to person, place and time, judgement and insight and normal, recent and remote memory intact and mood and affect are normal  Skin: skin and subcutaneous tissue is normal without visible mass,   Head and Face: visual inspection  of head and face revealed no abnormalities  Eyes: visual inspection showed no lid or conjunctival swelling, erythema or discharge, pupils are normal, equal, round  Ears/Nose: external inspection of ears and nose revealed no abnormalities, hearing is grossly normal  Oropharynx/Mouth/Face: lips, tongue and gums appear  normal with no lesions, the voice quality was normal  Neck: neck appears symmetric, with no visible masses,   Pulmonary: no increased work of breathing or signs of respiratory distress,  Musculoskeletal: normal on inspection    Neurological: normal coordination and normal general cortical function      Lab Results   Component Value Date/Time    LABA1C 6.0 2022 11:55 AM    LABA1C 6.2 2021 11:50 AM         ASSESSMENT/PLAN:    1.  Type 2 diabetes mellitus without complication, without long-term current use of insulin     Not on meds   Aic 6.0  She has been restricting her Carbs and was able to stop both her oral hypoglycemic agents in 2021 ( she was on metformin and Glyburide )   Metformin also gave her Gastric  SE anyways      Health Maintenance   Last Eye Exam: advised to have annual dilated eye exam. her last eye exam was in July 2021 showed  retinopathy s/p laser   Last Podiatry Exam:  Last foot exam was with podiatry   Lipids: Last LDL was within normal limits  Microalbumin/Creatinine Ratio: I have ordered MA with next lab work       2. Essential hypertension  She is on lisinopril 40 mg + Hctz 25 + amlodipine 5 mg daily   Increased amlodipine to 10 mg daily sent a prescription for blood pressure cuff. 21 need to take care of the blood pressure as her previous primary care physician has retired, she was advised to schedule with a new primary care physician. 3. Malignant neoplasm of both ovaries ([facility] [address]  She is s/p complete hysterectomy   She is on Chemo as per [facility]   On magnesium which has been helping     4. Gastroesophageal reflux disease with esophagitis without hemorrhage  Stable       Reviewed and/or ordered clinical lab results yes   Reviewed and/or ordered radiology tests Yes  Reviewed and/or ordered other diagnostic tests yes   Made a decision to obtain old records yes   Reviewed and summarized old records yes     Susan Maynard was counseled regarding symptoms of current diagnosis, course and complications of disease if inadequately treated, side effects of medications, diagnosis, treatment options, and prognosis, risks, benefits, complications, and alternatives of treatment, labs, imaging and other studies and treatment targets and goals. She understands instructions and counseling            Return in about 3 months (around 2/2/2023). Please note that some or all of this report was generated using voice recognition software. Please notify me in case of any questions about the content of this document, as some errors in transcription may have occurred .

## 2022-11-04 NOTE — TELEPHONE ENCOUNTER
MILKA,    Call from patient wanting to inform Dr. Niharika Paris that she is now taking her magnesium 3 hours prior to taking her BP medication and it has lowered her BP and is no longer having any issues with high BP

## 2022-11-08 LAB — ALDOSTERONE: 9.6 NG/DL

## 2022-11-09 LAB — RENIN PLASMA: 6 NG/ML/HR

## 2022-11-14 ENCOUNTER — TELEPHONE (OUTPATIENT)
Dept: ENDOCRINOLOGY | Age: 57
End: 2022-11-14

## 2022-11-14 NOTE — TELEPHONE ENCOUNTER
Pt called stating she would like to review recent 11/05 lab results. Also, pt said she will just tart the new 10 mg of Amlodipine tomorrow.

## 2022-11-14 NOTE — TELEPHONE ENCOUNTER
Please advise that aldosterone and renin test results were within normal limits, vitamin B12 was also within normal limits. Most of the other labs pertaining to endocrine were all within normal limits.

## 2022-12-21 NOTE — PROGRESS NOTES
Medical Nutrition Therapy for Diabetes    Fredrick Jennifer  December 21, 2022      Patient Care Team:  Feliberto Mathews MD as PCP - [address],  as Obstetrician (Obstetrics & Gynecology)  Rosa Fish MD as Consulting Physician (General Surgery)    Reason for visit: Type 2     ASSESSMENT/PLAN:   NUTRITION DIAGNOSIS  Follow up  Identified patient by name and date of birth. Patient is meeting this Virtual appointment at home. I am at [facility] Endocrinology office. Patient is the sole participant on the video call. Pursuant to the emergency declaration under the [facility], [facility] waiver authority and the [employer] Act this Virtual Visit was insisted, with patient's consent, to reduce the patient's risk of exposure to COVID-19 and provide continuity of care for an established patient. #1 Problem: Altered Nutrition-Related Laboratory Values (NC-2.2)  Related to: Endocrine/Diabetes   As Evidenced by: Elevated Plasma glucose and/or HgbA1c levels      #2 Problem: Knowledge and Beliefs-NB-3.1                       Food and nutrition deficits      #3 Problem: Inadequate Carbohydrate Intake  Related to: food and nutrition knowledge deficit regarding controlling blood glucose  As evidenced by: food recall with less than 15 g carbohydrate per meal.       NUTRITION INTERVENTION  Nutrition Prescription: 30 grams carbohydrate per meal with protein and non-starch vegetables  15 gram carbohydrate snacks     Diabetes Education/Counseling included:  Reinforced 3 meals and 30 gram carbohydrate meals with protein. Reviewed water benefits on glucose. Interventions:  Encouraged continued water intake with adequate volume. Encouraged 3 meals and snacks daily. Avoid delayed intervals.     NUTRITION MONITORING AND EVALUATION  3 meals  30 gram carbohydrate meals with protein  Continue water daily       Patient Active Problem List   Diagnosis    Diabetes mellitus ([facility] [address]    Hiatal hernia    Essential hypertension    Morbid obesity with BMI of 40.0-44.9, adult (HCC)    GERD (gastroesophageal reflux disease)    Malignant neoplasm of both ovaries (HCC)       Current Outpatient Medications   Medication Sig Dispense Refill    magnesium gluconate (MAGONATE) 500 MG tablet 2 tabs twice a day      b complex vitamins capsule Take 1 capsule by mouth daily      Cholecalciferol (VITAMIN D) 50 MCG (2000 UT) CAPS capsule Take by mouth      Blood Pressure Monitoring (COMFORT TOUCH BP CUFF/MEDIUM) MISC Check BP daily 1 each 0    amLODIPine (NORVASC) 10 MG tablet Take 1 tablet by mouth daily 90 tablet 1    lisinopril (PRINIVIL;ZESTRIL) 40 MG tablet Take 1 tablet by mouth daily 30 tablet 3    hydroCHLOROthiazide (HYDRODIURIL) 25 MG tablet Take 1 tablet by mouth daily 30 tablet 3    ONETOUCH VERIO strip        No current facility-administered medications for this visit.          NUTRITION ASSESSMENT    Biochemical Data:    Lab Results   Component Value Date    LABA1C 6.0 09/28/2022     Lab Results   Component Value Date    .5 09/28/2022       Lab Results   Component Value Date    CHOL 152 09/28/2022    CHOL 151 07/17/2021     Lab Results   Component Value Date    TRIG 64 09/28/2022    TRIG 38 07/17/2021     Lab Results   Component Value Date    HDL 67 (H) 09/28/2022    HDL 48 07/17/2021     Lab Results   Component Value Date    LDLCALC 72 09/28/2022    [address] 07/17/2021     Lab Results   Component Value Date    LABVLDL 13 09/28/2022    LABVLDL 8 07/17/2021     No results found for: CHOLHDLRATIO    Lab Results   Component Value Date    WBC 3.8 11/05/2022    HGB 12.0 11/05/2022    HCT 35.7 (L) 11/05/2022    MCV 86.9 11/05/2022     11/05/2022       Lab Results   Component Value Date    CREATININE 0.70 11/05/2022    BUN 21 11/05/2022     11/05/2022    K 3.9 11/05/2022     11/05/2022    CO2 31 11/05/2022       Diabetes Medications: No  Knows name and dose of prescribed medications   Knows prescribed schedule for medications  Recent change in medication type/dosage:   Stores  medications properly  Comments:     Monitoring:   Has BG meter: Yes  Testing frequency: fasting  Recent results: 81  Hypoglycemia? No    Anthropometric Measurements: Wt:   Wt Readings from Last 3 Encounters:   11/02/22 219 lb (99.3 kg)   09/29/22 216 lb (98 kg)   09/04/18 239 lb (108.4 kg)      BMI:   BMI Readings from Last 3 Encounters:   11/02/22 35.89 kg/m²   09/29/22 35.40 kg/m²   09/04/18 39.17 kg/m²     Patient's stated goal weight:   7% Weight loss goal weight:     Physical Activity History:   Physical activity: walking  Frequency of activity: most of a work shift  Duration of activity: variable  Obstacles to activity: chemo days    Sleep: uses C-pap    Food and Nutrition History:   Nutrition Awareness/Previous DSMES: yes  Number of people in household: 1  Frequency of Meals Eaten away from home:\"I try not to. \"  Food Availability Problems  Within the past 12 months, have you worried that your food would run out before you got money to buy more? Yes  Within the past 12 months, has the food you bought not lasted till the end of the month and you didn't have money to get more? Yes  Beverage consumption: water - 64-80 ounces  Alcohol consumption: No  Usual Food consumption:   2-3 meals, 2-3 snacks,  30-45 gram carbohydrate meals    Barriers:   -none          Follow Up Plan: Will remain available. Contact information given.      Referring Provider: Deejay Barrientos MD  Time spent with patient: 15 minutes

## 2022-12-22 ENCOUNTER — TELEMEDICINE (OUTPATIENT)
Dept: ENDOCRINOLOGY | Age: 57
End: 2022-12-22

## 2022-12-22 DIAGNOSIS — E11.9 TYPE 2 DIABETES MELLITUS WITHOUT COMPLICATION, WITHOUT LONG-TERM CURRENT USE OF INSULIN (HCC): Primary | ICD-10-CM

## 2023-02-14 ENCOUNTER — TELEPHONE (OUTPATIENT)
Dept: ENDOCRINOLOGY | Age: 58
End: 2023-02-14

## 2023-02-14 NOTE — TELEPHONE ENCOUNTER
Pt calling and states she called last week and asked this question. She is asking if the blood pressure or thyroid medication would be causing her to have kidney/bladder infection?  She stopped taking her magnesium and water pill    # [phone]

## 2023-02-14 NOTE — TELEPHONE ENCOUNTER
Patient aware. She is not taking any of those types of medications. She has already seen her PCP and is on medication for the UTI.

## 2023-03-11 ENCOUNTER — HOSPITAL ENCOUNTER (INPATIENT)
Age: 58
LOS: 2 days | Discharge: HOME | DRG: 394 | End: 2023-03-13
Attending: EMERGENCY MEDICINE | Admitting: INTERNAL MEDICINE
Payer: COMMERCIAL

## 2023-03-11 ENCOUNTER — APPOINTMENT (OUTPATIENT)
Dept: CT IMAGING | Age: 58
DRG: 394 | End: 2023-03-11
Payer: COMMERCIAL

## 2023-03-11 DIAGNOSIS — K56.600 PARTIAL SMALL BOWEL OBSTRUCTION (HCC): Primary | ICD-10-CM

## 2023-03-11 DIAGNOSIS — K42.0 UMBILICAL HERNIA WITH OBSTRUCTION, WITHOUT GANGRENE: ICD-10-CM

## 2023-03-11 PROBLEM — D64.9 ANEMIA: Status: ACTIVE | Noted: 2023-03-11

## 2023-03-11 PROBLEM — K42.9 UMBILICAL HERNIA: Status: ACTIVE | Noted: 2023-03-11

## 2023-03-11 LAB
A/G RATIO: 1.3 (ref 1.1–2.2)
ALBUMIN SERPL-MCNC: 4 G/DL (ref 3.4–5)
ALP BLD-CCNC: 64 U/L (ref 40–129)
ALT SERPL-CCNC: 22 U/L (ref 10–40)
ANION GAP SERPL CALCULATED.3IONS-SCNC: 11 MMOL/L (ref 3–16)
ANISOCYTOSIS: ABNORMAL
AST SERPL-CCNC: 27 U/L (ref 15–37)
BACTERIA: ABNORMAL /HPF
BASOPHILS ABSOLUTE: 0 K/UL (ref 0–0.2)
BASOPHILS RELATIVE PERCENT: 0 %
BILIRUB SERPL-MCNC: 0.3 MG/DL (ref 0–1)
BILIRUBIN URINE: NEGATIVE
BLOOD, URINE: NEGATIVE
BUN BLDV-MCNC: 19 MG/DL (ref 7–20)
CALCIUM SERPL-MCNC: 10.1 MG/DL (ref 8.3–10.6)
CHLORIDE BLD-SCNC: 98 MMOL/L (ref 99–110)
CLARITY: ABNORMAL
CO2: 30 MMOL/L (ref 21–32)
COLOR: ABNORMAL
CREAT SERPL-MCNC: 0.8 MG/DL (ref 0.6–1.1)
EOSINOPHILS ABSOLUTE: 0 K/UL (ref 0–0.6)
EOSINOPHILS RELATIVE PERCENT: 0 %
EPITHELIAL CELLS, UA: 4 /HPF (ref 0–5)
GFR SERPL CREATININE-BSD FRML MDRD: >60 ML/MIN/{1.73_M2}
GLUCOSE BLD-MCNC: 131 MG/DL (ref 70–99)
GLUCOSE URINE: NEGATIVE MG/DL
HCT VFR BLD CALC: 33.8 % (ref 36–48)
HEMOGLOBIN: 11.4 G/DL (ref 12–16)
HYALINE CASTS: 43 /LPF (ref 0–8)
INR BLD: 0.98 (ref 0.87–1.14)
KETONES, URINE: ABNORMAL MG/DL
LACTIC ACID, SEPSIS: 1 MMOL/L (ref 0.4–1.9)
LEUKOCYTE ESTERASE, URINE: NEGATIVE
LIPASE: 12 U/L (ref 13–60)
LYMPHOCYTES ABSOLUTE: 0.7 K/UL (ref 1–5.1)
LYMPHOCYTES RELATIVE PERCENT: 16 %
MCH RBC QN AUTO: 29.3 PG (ref 26–34)
MCHC RBC AUTO-ENTMCNC: 33.6 G/DL (ref 31–36)
MCV RBC AUTO: 87.2 FL (ref 80–100)
MICROSCOPIC EXAMINATION: YES
MONOCYTES ABSOLUTE: 0.3 K/UL (ref 0–1.3)
MONOCYTES RELATIVE PERCENT: 6 %
NEUTROPHILS ABSOLUTE: 3.3 K/UL (ref 1.7–7.7)
NEUTROPHILS RELATIVE PERCENT: 78 %
NITRITE, URINE: NEGATIVE
PDW BLD-RTO: 19.2 % (ref 12.4–15.4)
PH UA: 5 (ref 5–8)
PLATELET # BLD: 82 K/UL (ref 135–450)
PLATELET SLIDE REVIEW: ABNORMAL
PMV BLD AUTO: 7.3 FL (ref 5–10.5)
POTASSIUM REFLEX MAGNESIUM: 3.8 MMOL/L (ref 3.5–5.1)
PROTEIN UA: 100 MG/DL
PROTHROMBIN TIME: 12.9 SEC (ref 11.7–14.5)
RBC # BLD: 3.88 M/UL (ref 4–5.2)
RBC UA: 5 /HPF (ref 0–4)
SLIDE REVIEW: ABNORMAL
SODIUM BLD-SCNC: 139 MMOL/L (ref 136–145)
SPECIFIC GRAVITY UA: 1.02 (ref 1–1.03)
TOTAL PROTEIN: 7.2 G/DL (ref 6.4–8.2)
URINE REFLEX TO CULTURE: ABNORMAL
URINE TYPE: ABNORMAL
UROBILINOGEN, URINE: 1 E.U./DL
WBC # BLD: 4.2 K/UL (ref 4–11)
WBC UA: 1 /HPF (ref 0–5)

## 2023-03-11 PROCEDURE — 81001 URINALYSIS AUTO W/SCOPE: CPT

## 2023-03-11 PROCEDURE — 1200000000 HC SEMI PRIVATE

## 2023-03-11 PROCEDURE — 6370000000 HC RX 637 (ALT 250 FOR IP): Performed by: INTERNAL MEDICINE

## 2023-03-11 PROCEDURE — 6360000002 HC RX W HCPCS: Performed by: EMERGENCY MEDICINE

## 2023-03-11 PROCEDURE — 96360 HYDRATION IV INFUSION INIT: CPT

## 2023-03-11 PROCEDURE — 2580000003 HC RX 258: Performed by: INTERNAL MEDICINE

## 2023-03-11 PROCEDURE — 36415 COLL VENOUS BLD VENIPUNCTURE: CPT

## 2023-03-11 PROCEDURE — 80053 COMPREHEN METABOLIC PANEL: CPT

## 2023-03-11 PROCEDURE — 83605 ASSAY OF LACTIC ACID: CPT

## 2023-03-11 PROCEDURE — 85025 COMPLETE CBC W/AUTO DIFF WBC: CPT

## 2023-03-11 PROCEDURE — 99285 EMERGENCY DEPT VISIT HI MDM: CPT

## 2023-03-11 PROCEDURE — 87040 BLOOD CULTURE FOR BACTERIA: CPT

## 2023-03-11 PROCEDURE — 74176 CT ABD & PELVIS W/O CONTRAST: CPT

## 2023-03-11 PROCEDURE — 99222 1ST HOSP IP/OBS MODERATE 55: CPT | Performed by: SURGERY

## 2023-03-11 PROCEDURE — 2580000003 HC RX 258: Performed by: EMERGENCY MEDICINE

## 2023-03-11 PROCEDURE — 85610 PROTHROMBIN TIME: CPT

## 2023-03-11 PROCEDURE — 6360000002 HC RX W HCPCS: Performed by: INTERNAL MEDICINE

## 2023-03-11 PROCEDURE — 83690 ASSAY OF LIPASE: CPT

## 2023-03-11 RX ORDER — ENOXAPARIN SODIUM 100 MG/ML
40 INJECTION SUBCUTANEOUS DAILY
Status: DISCONTINUED | OUTPATIENT
Start: 2023-03-11 | End: 2023-03-13 | Stop reason: HOSPADM

## 2023-03-11 RX ORDER — ONDANSETRON 2 MG/ML
4 INJECTION INTRAMUSCULAR; INTRAVENOUS
Status: DISCONTINUED | OUTPATIENT
Start: 2023-03-11 | End: 2023-03-11

## 2023-03-11 RX ORDER — LISINOPRIL 20 MG/1
40 TABLET ORAL DAILY
Status: DISCONTINUED | OUTPATIENT
Start: 2023-03-11 | End: 2023-03-13 | Stop reason: HOSPADM

## 2023-03-11 RX ORDER — ONDANSETRON 2 MG/ML
4 INJECTION INTRAMUSCULAR; INTRAVENOUS EVERY 6 HOURS PRN
Status: DISCONTINUED | OUTPATIENT
Start: 2023-03-11 | End: 2023-03-13 | Stop reason: HOSPADM

## 2023-03-11 RX ORDER — POTASSIUM CHLORIDE 7.45 MG/ML
10 INJECTION INTRAVENOUS PRN
Status: DISCONTINUED | OUTPATIENT
Start: 2023-03-11 | End: 2023-03-13 | Stop reason: HOSPADM

## 2023-03-11 RX ORDER — DICYCLOMINE HYDROCHLORIDE 10 MG/1
10 CAPSULE ORAL ONCE
Status: DISCONTINUED | OUTPATIENT
Start: 2023-03-11 | End: 2023-03-13 | Stop reason: HOSPADM

## 2023-03-11 RX ORDER — SODIUM CHLORIDE 9 MG/ML
25 INJECTION, SOLUTION INTRAVENOUS PRN
Status: DISCONTINUED | OUTPATIENT
Start: 2023-03-11 | End: 2023-03-13 | Stop reason: HOSPADM

## 2023-03-11 RX ORDER — POTASSIUM CHLORIDE 20 MEQ/1
40 TABLET, EXTENDED RELEASE ORAL PRN
Status: DISCONTINUED | OUTPATIENT
Start: 2023-03-11 | End: 2023-03-13 | Stop reason: HOSPADM

## 2023-03-11 RX ORDER — DICYCLOMINE HYDROCHLORIDE 10 MG/1
20 CAPSULE ORAL 3 TIMES DAILY PRN
Status: DISCONTINUED | OUTPATIENT
Start: 2023-03-11 | End: 2023-03-13 | Stop reason: HOSPADM

## 2023-03-11 RX ORDER — HYDROCHLOROTHIAZIDE 25 MG/1
25 TABLET ORAL DAILY
Status: DISCONTINUED | OUTPATIENT
Start: 2023-03-11 | End: 2023-03-11

## 2023-03-11 RX ORDER — ONDANSETRON 4 MG/1
4 TABLET, ORALLY DISINTEGRATING ORAL EVERY 8 HOURS PRN
Status: DISCONTINUED | OUTPATIENT
Start: 2023-03-11 | End: 2023-03-13 | Stop reason: HOSPADM

## 2023-03-11 RX ORDER — 0.9 % SODIUM CHLORIDE 0.9 %
1000 INTRAVENOUS SOLUTION INTRAVENOUS ONCE
Status: COMPLETED | OUTPATIENT
Start: 2023-03-11 | End: 2023-03-11

## 2023-03-11 RX ORDER — SODIUM CHLORIDE 0.9 % (FLUSH) 0.9 %
5-40 SYRINGE (ML) INJECTION EVERY 12 HOURS SCHEDULED
Status: DISCONTINUED | OUTPATIENT
Start: 2023-03-11 | End: 2023-03-13 | Stop reason: HOSPADM

## 2023-03-11 RX ORDER — SODIUM CHLORIDE 0.9 % (FLUSH) 0.9 %
5-40 SYRINGE (ML) INJECTION PRN
Status: DISCONTINUED | OUTPATIENT
Start: 2023-03-11 | End: 2023-03-13 | Stop reason: HOSPADM

## 2023-03-11 RX ORDER — ACETAMINOPHEN 650 MG/1
650 SUPPOSITORY RECTAL EVERY 6 HOURS PRN
Status: DISCONTINUED | OUTPATIENT
Start: 2023-03-11 | End: 2023-03-13 | Stop reason: HOSPADM

## 2023-03-11 RX ORDER — SODIUM CHLORIDE 9 MG/ML
INJECTION, SOLUTION INTRAVENOUS CONTINUOUS
Status: DISCONTINUED | OUTPATIENT
Start: 2023-03-11 | End: 2023-03-13 | Stop reason: HOSPADM

## 2023-03-11 RX ORDER — AMLODIPINE BESYLATE 5 MG/1
10 TABLET ORAL DAILY
Status: DISCONTINUED | OUTPATIENT
Start: 2023-03-11 | End: 2023-03-13 | Stop reason: HOSPADM

## 2023-03-11 RX ORDER — ACETAMINOPHEN 325 MG/1
650 TABLET ORAL EVERY 6 HOURS PRN
Status: DISCONTINUED | OUTPATIENT
Start: 2023-03-11 | End: 2023-03-13 | Stop reason: HOSPADM

## 2023-03-11 RX ADMIN — DICYCLOMINE HYDROCHLORIDE 20 MG: 10 CAPSULE ORAL at 23:01

## 2023-03-11 RX ADMIN — ENOXAPARIN SODIUM 40 MG: 100 INJECTION SUBCUTANEOUS at 10:58

## 2023-03-11 RX ADMIN — LISINOPRIL 40 MG: 20 TABLET ORAL at 10:58

## 2023-03-11 RX ADMIN — SODIUM CHLORIDE 75 ML/HR: 9 INJECTION, SOLUTION INTRAVENOUS at 07:49

## 2023-03-11 RX ADMIN — AMLODIPINE BESYLATE 10 MG: 5 TABLET ORAL at 10:59

## 2023-03-11 RX ADMIN — SODIUM CHLORIDE 1000 ML: 9 INJECTION, SOLUTION INTRAVENOUS at 04:50

## 2023-03-11 ASSESSMENT — PAIN DESCRIPTION - ONSET: ONSET: PROGRESSIVE

## 2023-03-11 ASSESSMENT — ENCOUNTER SYMPTOMS
ABDOMINAL DISTENTION: 1
APNEA: 0
CHEST TIGHTNESS: 0
EYE DISCHARGE: 0
NAUSEA: 1
ABDOMINAL PAIN: 1
EYE ITCHING: 0
VOMITING: 1
BACK PAIN: 0

## 2023-03-11 ASSESSMENT — PAIN SCALES - GENERAL
PAINLEVEL_OUTOF10: 0
PAINLEVEL_OUTOF10: 4

## 2023-03-11 ASSESSMENT — PAIN DESCRIPTION - DESCRIPTORS: DESCRIPTORS: CRAMPING

## 2023-03-11 ASSESSMENT — PAIN - FUNCTIONAL ASSESSMENT: PAIN_FUNCTIONAL_ASSESSMENT: ACTIVITIES ARE NOT PREVENTED

## 2023-03-11 ASSESSMENT — PAIN DESCRIPTION - LOCATION: LOCATION: ABDOMEN

## 2023-03-11 ASSESSMENT — PAIN DESCRIPTION - FREQUENCY: FREQUENCY: INTERMITTENT

## 2023-03-11 ASSESSMENT — PAIN DESCRIPTION - PAIN TYPE: TYPE: ACUTE PAIN

## 2023-03-11 NOTE — PROGRESS NOTES
Patient admitted to the unit from ED, pt is a&o x4 and on room air. Pt denies any discomfort or pain at this time. Head to toe completed and VSS. Will continue to monitor.

## 2023-03-11 NOTE — H&P
History and Physical  Dr. Tahmina Pemberton  3/11/2023    PCP: Luana Em MD    Cc:   Chief Complaint   Patient presents with    Abdominal Pain     Pt is on amoxicillin, gets chemo tx, took culturelle today and also takes magnesium, states she is now feeling nauseous and vomiting after her last dose of abx, also c/o abdominal pain. Was told last time she was on abx not to take the RX for magnesium       HPI:  Nilsa Buck is a 62 y.o. female who has a past medical history of Diabetes mellitus ([address] GERD (gastroesophageal reflux disease), Hiatal hernia, Hypertension, Malignant neoplasm of both ovaries ([address] and Morbid obesity with BMI of 40.0-44.9, adult ([address] Patient presents with Partial small bowel obstruction ([address] HPI  (1-3 for expanded problem focused, ?4 for detailed/comprehensive)     62 y.o. female  who presents to the ED complaining of feeling abdominal pain this morning around her umbilical hernia. She has had this ever since her hysterectomy 12/2020. She is on chemotherapy for ovarian CA history at [address] [place] [address]. She started vomiting today as well. No dysuria or hematuria. No bloody stool. She says she had a small watery stool today. Ct abd from ER is reviewed by self - psbo seen with transition in umbilical hernia    Case d/w dr Angelo Stokes from ER who says that he was able to manually reduce the hernia. Pt to be admitted, kept NPO, general surgery to see. Iv pain meds ordered for pain control             Problem list of hospitalization thus far:   Active Hospital Problems    Diagnosis     Partial small bowel obstruction ([address] [K56.600]      Priority: Medium    Umbilical hernia [U65.9]      Priority: Medium    Anemia [D64.9]      Priority: Medium    Diabetes mellitus ([address] [E11.9]     Essential hypertension [I10]     GERD (gastroesophageal reflux disease) [K21.9]          Review of Systems: (1 system for EPF, 2-9 for detailed, 10+ for comprehensive)  Constitutional: Negative for chills and fever.     HENT: Negative for dental problem, nosebleeds and rhinorrhea. Eyes: Negative for photophobia and visual disturbance. Respiratory: Negative for cough, chest tightness and shortness of breath. Cardiovascular: Negative for chest pain and leg swelling. Gastrointestinal: Negative for diarrhea, positive for nausea and vomiting. Endocrine: Negative for polydipsia and polyphagia. Genitourinary: Negative for frequency, hematuria and urgency. Musculoskeletal: Negative for back pain and myalgias. Skin: Negative for rash. Allergic/Immunologic: Negative for food allergies. Neurological: Negative for dizziness, seizures, syncope and facial asymmetry. Hematological: Negative for adenopathy. Psychiatric/Behavioral: Negative for dysphoric mood. The patient is not nervous/anxious.              Past Medical History:   Past Medical History:   Diagnosis Date    Diabetes mellitus ([facility] [address]     GERD (gastroesophageal reflux disease)     Hiatal hernia     Hypertension     Malignant neoplasm of both ovaries ([facility] [address] 2021    Morbid obesity with BMI of 40.0-44.9, adult [facility])        Past Surgical History:   Past Surgical History:   Procedure Laterality Date     SECTION      1    CHOLECYSTECTOMY      COLONOSCOPY  2018    Colonoscopy up to cecum with biopsy polypectomy of rectum annd snare polypectomy of hepatic flexure    WISDOM TOOTH EXTRACTION         Social History:   Social History       Tobacco History       Smoking Status  Never      Smokeless Tobacco Use  Never              Alcohol History       Alcohol Use Status  No              Drug Use       Drug Use Status  No              Sexual Activity       Sexually Active  Not Asked                    Fam History:   Family History   Problem Relation Age of Onset    High Blood Pressure Mother     Heart Disease Father         passed at 61    Cancer Maternal Uncle         breast       PFSH: The above PMHx, PSHx, SocHx, FamHx has been reviewed by myself. (1 area for detailed, 2-3 for comprehensive)      Code Status: Full Code    Meds - following list of home medications fromelectronic chart has been reviewed by myself  Prior to Admission medications    Medication Sig Start Date End Date Taking?  Authorizing Provider   magnesium gluconate (MAGONATE) 500 MG tablet 2 tabs twice a day 10/28/22   Historical Provider, MD   b complex vitamins capsule Take 1 capsule by mouth daily    Historical Provider, MD   Cholecalciferol (VITAMIN D) 50 MCG (2000 UT) CAPS capsule Take by mouth    Historical Provider, MD   Blood Pressure Monitoring (COMFORT TOUCH BP CUFF/MEDIUM) MISC Check BP daily 11/2/22   Chloe Dallas MD   amLODIPine (NORVASC) 10 MG tablet Take 1 tablet by mouth daily 11/2/22   Chloe Dallas MD   lisinopril (PRINIVIL;ZESTRIL) 40 MG tablet Take 1 tablet by mouth daily 10/24/22   Chloe Dallas MD   hydroCHLOROthiazide (HYDRODIURIL) 25 MG tablet Take 1 tablet by mouth daily  Patient not taking: Reported on 3/11/2023 10/24/22   MD Davis [employer] strip  5/1/21   Historical Provider, MD         Allergies   Allergen Reactions    Augmentin [Amoxicillin-Pot Clavulanate]      STOMACH UPSET             EXAM: (2-7 system for EPF/Detailed, ?8 for Comprehensive)  BP (!) 143/87   Pulse 100   Temp 98 °F (36.7 °C) (Oral)   Resp 16   Wt 200 lb (90.7 kg)   SpO2 100%   BMI 32.78 kg/m²   Constitutional: vitals as above: alert, appears stated age and cooperative    Psychiatric: normal insight and judgment, oriented to person, place, time, and general circumstances    Head: Normocephalic, without obvious abnormality, atraumatic    Eyes:lids and lashes normal, conjunctivae and sclerae normal and pupils equal, round, reactive to light and accomodation    EMNT: external ears normal, nares midline    Neck: no carotid bruit, supple, symmetrical, trachea midline and thyroid not enlarged, symmetric, no tenderness/mass/nodules     Respiratory: clear to auscultation and percussion bilaterally with normal respiratory effort    Cardiovascular: elevated rate, regular rhythm, normal S1 and S2 and no murmurs    Gastrointestinal: soft, non-tender, non-distended, normal bowel sounds, no masses or organomegaly    Extremities: no clubbing, no edema    Skin:No rashes or nodules noted.    Neurologic:negative         LABS:  Labs Reviewed   CBC WITH AUTO DIFFERENTIAL - Abnormal; Notable for the following components:       Result Value    RBC 3.88 (*)     Hemoglobin 11.4 (*)     Hematocrit 33.8 (*)     RDW 19.2 (*)     Platelets 82 (*)     Lymphocytes Absolute 0.7 (*)     Anisocytosis 1+ (*)     All other components within normal limits   COMPREHENSIVE METABOLIC PANEL W/ REFLEX TO MG FOR LOW K - Abnormal; Notable for the following components:    Chloride 98 (*)     Glucose 131 (*)     All other components within normal limits   LIPASE - Abnormal; Notable for the following components:    Lipase 12.0 (*)     All other components within normal limits   URINALYSIS WITH REFLEX TO CULTURE - Abnormal; Notable for the following components:    Color, UA DARK YELLOW (*)     Clarity, UA CLOUDY (*)     Ketones, Urine TRACE (*)     Protein,  (*)     All other components within normal limits   MICROSCOPIC URINALYSIS - Abnormal; Notable for the following components:    Hyaline Casts, UA 43 (*)     RBC, UA 5 (*)     All other components within normal limits   CULTURE, BLOOD 1   CULTURE, BLOOD 2   PROTIME-INR   LACTATE, SEPSIS         IMAGING:  Imaging results from computerized chart.  Any imaging that has been independently reviewed as noted elsewhere in chart.  CT ABDOMEN PELVIS WO CONTRAST Additional Contrast? None    Result Date: 3/11/2023  EXAMINATION: CT OF THE ABDOMEN AND PELVIS WITHOUT CONTRAST 3/11/2023 3:21 am TECHNIQUE: CT of the abdomen and pelvis was performed without the administration of intravenous contrast. Multiplanar reformatted images are provided for review. Automated  exposure control, iterative reconstruction, and/or weight based adjustment of the mA/kV was utilized to reduce the radiation dose to as low as reasonably achievable. COMPARISON: September 4, 2018 HISTORY: ORDERING SYSTEM PROVIDED HISTORY: abd pain TECHNOLOGIST PROVIDED HISTORY: Reason for exam:->abd pain Additional Contrast?->None Decision Support Exception - unselect if not a suspected or confirmed emergency medical condition->Emergency Medical Condition (MA) Reason for Exam: abd pain Relevant Medical/Surgical History: Abdominal Pain (Pt is on amoxicillin, gets chemo tx, took culturelle today and also takes magnesium, states she is now feeling nauseous and vomiting after her last dose of abx, also c/o abdominal pain. Was told last time she was on abx not to take the RX for magnesium) FINDINGS: Lower Chest: Clear lung bases. Organs: Status post cholecystectomy. No intra or extrahepatic biliary dilatation. The liver parenchyma, spleen, pancreas, adrenal glands, and kidneys demonstrate no acute abnormality. The collecting systems are normal. GI/bowel: The stomach is distended with fluid but otherwise unremarkable. The small bowel is fluid-filled and distended, measuring up to 3.9 cm. A umbilical hernia containing small bowel is seen. Bowel distal to this is decompressed. Normal appendix. The colon is mostly decompressed. Pelvis: The bladder is mostly decompressed. Status post hysterectomy. No suspicious adnexal masses. Peritoneum/Retroperitoneum: No free fluid or free air. No pathologic lymphadenopathy. Vasculature demonstrates no acute abnormality. Moderate atherosclerosis is noted. Bones/Soft Tissues: No acute or aggressive osseous lesion. 1. Partial small bowel obstruction with a transition point located in the umbilical hernia. The bowel distal to this is decompressed. 2. Status post hysterectomy and cholecystectomy.          EKG:     Lab Results   Component Value Date/Time    GLUCOSE 131 03/11/2023 04:55 AM     Lab Results   Component Value Date/Time    POCGLU 350 09/04/2018 04:03 AM     BP (!) 143/87   Pulse 100   Temp 98 °F (36.7 °C) (Oral)   Resp 16   Wt 200 lb (90.7 kg)   SpO2 100%   BMI 32.78 kg/m²     MEDICAL DECISION MAKING:    Principal Problem:    Partial small bowel obstruction (HCC) -New Problem to me. Seen on ct scan. Transistion point in umbilical hernia which has since been reduced  Plan: npo, ask gen surg to see. Iv pain meds ordered  Active Problems:    Umbilical hernia -Established problem. Stable. reduced  Plan: will see whether emergent repair needed. Consult surgery    Anemia -Established problem. Stable. Hgb 11.4; likely low 2/2 chemotx  Plan: No indication for transfusion. Cont to monitor h/h to assess progression of anemia. Repeat Hgb level ordered. Recommend ferrous sulfate or MVI as outpatient. Diabetes mellitus ([address] -Established problem. Stable. Glu 131  Plan: Patient NPO now, when able to take po, to be placed on controlled carbohydrate diet. Fingerstick sugars to be checked to monitor for both hypoglycemia as well as hyperglycemia. Sliding scale insulin ordered. Glucagon and dextrose ordered for hypoglycemia. Patient will be continued on home medications. Hemoglobin a1c to be ordered to assess efficacy of therapy. Essential hypertension -Established problem. Stable. 143/87  Plan: Pt home BP meds reviewed and will be continued. IV Hydralazine ordered for control of extremely high blood pressures. Will monitor labs to assess Creat/K for possible complications of medications. GERD (gastroesophageal reflux disease)  Plan: cont home meds          Diagnoses as listed above, designated as new or established and plan outlined for each.       Case discussed with: angie miller    MDM Determination    PROBLEM  High: life threatening unstable chronic illness (I.e. severe COPD, asthma) or acute illness (i.e. MI, PE, DYLLAN, stroke,severe resp distress, acute change in neurologic status, suicidal ideation)  PLUS  high: iv narcotics or other iv meds which require monitoring (e.g. digoxin, amiodarone, vancomycin, coumadin, heparin, antiepileptics, chemotherapy,insulin drip, procrit)    OR TIME BASED  N/A - see problem based determination above           The patient is being placed in inpatient status with the expectation of requiring a hospital stay spanning at least two midnights for care and treatment of the problems noted in the problem list.  This determination is also based on thepatients comorbidities and past medical history, the severity and timing of the signs and symptoms upon presentation.     (Please note that portions of this note were completed with a voice recognition program.  Efforts were made to edit the dictations but occasionally words are mis-transcribed.)      Electronically signed by: Timur Crespo MD 3/11/2023

## 2023-03-11 NOTE — CONSULTS
David Foreman     Chief complaint-abdominal pain    HPI: 51-year-old female who is currently receiving treatment for stage III ovarian cancer and presents to the emergency room with acute onset of severe (8/10), sharp periumbilical abdominal pain. Nothing made the pain better or worse. Associated symptoms include multiple episodes of nausea and vomiting. She had similar symptoms about 1 month ago which were less severe. No history of fevers, chills or urinary symptoms. CAT scan showed an incarcerated hernia at the umbilicus. The hernia was able to be reduced by Dr. Marta Bell. The patient has no current abdominal symptomatology.     Past Medical History:   Diagnosis Date    Diabetes mellitus ([facility] [address]     GERD (gastroesophageal reflux disease)     Hiatal hernia     Hypertension     Malignant neoplasm of both ovaries ([facility] [address] 2021    Morbid obesity with BMI of 40.0-44.9, adult [facility])        Past Surgical History:   Procedure Laterality Date     SECTION      1    CHOLECYSTECTOMY      COLONOSCOPY  2018    Colonoscopy up to cecum with biopsy polypectomy of rectum annd snare polypectomy of hepatic flexure    WISDOM TOOTH EXTRACTION         Social History     Socioeconomic History    Marital status: Single     Spouse name: Not on file    Number of children: Not on file    Years of education: Not on file    Highest education level: Not on file   Occupational History    Not on file   Tobacco Use    Smoking status: Never    Smokeless tobacco: Never   Substance and Sexual Activity    Alcohol use: No     Alcohol/week: 0.0 standard drinks    Drug use: No    Sexual activity: Not on file   Other Topics Concern    Not on file   Social History Narrative    Not on file     Social Determinants of Health     Financial Resource Strain: Not on file   Food Insecurity: Not on file   Transportation Needs: Not on file   Physical Activity: Not on file   Stress: Not on file   Social Connections: Not on file   Intimate Partner Violence: Not on file   Housing Stability: Not on file       Allergies: Allergies   Allergen Reactions    Augmentin [Amoxicillin-Pot Clavulanate]      STOMACH UPSET       Prior to Admission medications    Medication Sig Start Date End Date Taking? Authorizing Provider   magnesium gluconate (MAGONATE) 500 MG tablet 2 tabs twice a day 10/28/22   Historical Provider, MD   b complex vitamins capsule Take 1 capsule by mouth daily    Historical Provider, MD   Cholecalciferol (VITAMIN D) 50 MCG (2000 UT) CAPS capsule Take by mouth    Historical Provider, MD   Blood Pressure Monitoring (COMFORT TOUCH BP CUFF/MEDIUM) MISC Check BP daily 11/2/22   Emmett Prior, MD   amLODIPine (NORVASC) 10 MG tablet Take 1 tablet by mouth daily 11/2/22   Emmett Prior, MD   lisinopril (PRINIVIL;ZESTRIL) 40 MG tablet Take 1 tablet by mouth daily 10/24/22   Emmett Prior, MD   hydroCHLOROthiazide (HYDRODIURIL) 25 MG tablet Take 1 tablet by mouth daily  Patient not taking: Reported on 3/11/2023 10/24/22   Emmett Prior, MD Rust Sue strip  5/1/21   Historical Provider, MD       Principal Problem:    Partial small bowel obstruction ([facility] [address]  Active Problems:    Umbilical hernia    Anemia    Diabetes mellitus ([facility] [address]    Essential hypertension    GERD (gastroesophageal reflux disease)  Resolved Problems:    * No resolved hospital problems. *      Blood pressure (!) 143/87, pulse 100, temperature 98 °F (36.7 °C), temperature source Oral, resp. rate 16, weight 200 lb (90.7 kg), SpO2 100 %. Review of Systems   Constitutional:  Positive for activity change and appetite change. Negative for chills and fever. HENT:  Negative for congestion and dental problem. Eyes:  Negative for discharge and itching. Respiratory:  Negative for apnea and chest tightness. Cardiovascular:  Negative for chest pain and leg swelling. Gastrointestinal:  Positive for abdominal distention, abdominal pain, nausea and vomiting.    Endocrine: Negative for cold intolerance and heat intolerance. Genitourinary:  Negative for difficulty urinating and dyspareunia. Musculoskeletal:  Negative for arthralgias and back pain. Allergic/Immunologic: Positive for immunocompromised state. Negative for environmental allergies and food allergies. Neurological:  Negative for dizziness and facial asymmetry. Hematological:  Negative for adenopathy. Does not bruise/bleed easily. Psychiatric/Behavioral:  Negative for agitation and behavioral problems. Physical Exam  Constitutional:       Appearance: She is well-developed. HENT:      Head: Normocephalic and atraumatic. Right Ear: External ear normal.      Left Ear: External ear normal.   Eyes:      Conjunctiva/sclera: Conjunctivae normal.   Cardiovascular:      Rate and Rhythm: Normal rate and regular rhythm. Pulmonary:      Effort: Pulmonary effort is normal.      Breath sounds: Normal breath sounds. Abdominal:      General: There is no distension. Palpations: Abdomen is soft. Comments: Soft hernia in the periumbilical location. The hernia is nontender. Her abdomen is not distended. Musculoskeletal:         General: Normal range of motion. Cervical back: Normal range of motion and neck supple. Skin:     General: Skin is warm and dry. Neurological:      Mental Status: She is alert and oriented to person, place, and time. Psychiatric:         Behavior: Behavior normal.       Assessment:  59-year-old female who is receiving chemotherapy at [facility] for stage III ovarian cancer who presented to the ED with complaints of sharp, severe periumbilical abdominal pain associated with nausea and vomiting. She has a history of hysterectomy in December 2020 at the [facility]. CAT scan in the ED showed a bowel containing hernia at the umbilicus which was causing a small bowel obstruction. The fascial defect measures about 4 x 3.1 cm by CAT scan.   Fortunately, the hernia was able to be reduced per Dr. Beryle Opal in the ED. At current time, she is having no abdominal pain, nausea or vomiting. Plan:  The patient is at high risk for repeat recurrence of incarceration and possible strangulation of bowel.  has an appointment at [facility] next week. May consider some interruption in her chemotherapy in order to proceed with laparoscopic incisional hernia repair. No plans for immediate repair this hospitalization. We will start a clear liquid diet.     Svetlana Pressley MD  3/11/2023

## 2023-03-11 NOTE — ED NOTES
Report called to Desire RN on the floor. Waiting for bed to be placed in the room.       Tran Lester RN  03/11/23 3340

## 2023-03-11 NOTE — ED PROVIDER NOTES
[facility]) Emergency [address] [facility]    Viviana Ramirez MD, am the primary clinician of record. CHIEF COMPLAINT  Chief Complaint   Patient presents with    Abdominal Pain     Pt is on amoxicillin, gets chemo tx, took culturelle today and also takes magnesium, states she is now feeling nauseous and vomiting after her last dose of abx, also c/o abdominal pain. Was told last time she was on abx not to take the RX for magnesium      HISTORY OF PRESENT ILLNESS  Goldie Michele is a 62 y.o. female  who presents to the ED complaining of feeling abdominal pain this morning around her umbilical hernia. She has had this ever since her hysterectomy 2020. She is on chemotherapy for ovarian CA history. She started vomiting today as well. No dysuria or hematuria. No bloody stool. She says she had a small watery stool today. Of note she is allergic to Augmentin but not amoxicillin - she was given amox by an urgent care on 3/5/23 for a possible acute sinusitis. She thinks that her magnesium may be contributing to some of her stools. No other complaints, modifying factors or associated symptoms. I have reviewed the following from the nursing documentation.     Past Medical History:   Diagnosis Date    Diabetes mellitus ([address]     GERD (gastroesophageal reflux disease)     Hiatal hernia     Hypertension     Malignant neoplasm of both ovaries ([address] 2021    Morbid obesity with BMI of 40.0-44.9, adult [facility])      Past Surgical History:   Procedure Laterality Date     SECTION      1    CHOLECYSTECTOMY      COLONOSCOPY  2018    Colonoscopy up to cecum with biopsy polypectomy of rectum annd snare polypectomy of hepatic flexure    WISDOM TOOTH EXTRACTION       Family History   Problem Relation Age of Onset    High Blood Pressure Mother     Heart Disease Father         passed at 61    Cancer Maternal Uncle         breast     Social History     Socioeconomic History    Marital status: Single     Spouse name: Not on file    Number of children: Not on file    Years of education: Not on file    Highest education level: Not on file   Occupational History    Not on file   Tobacco Use    Smoking status: Never    Smokeless tobacco: Never   Substance and Sexual Activity    Alcohol use: No     Alcohol/week: 0.0 standard drinks    Drug use: No    Sexual activity: Not on file   Other Topics Concern    Not on file   Social History Narrative    Not on file     Social Determinants of Health     Financial Resource Strain: Not on file   Food Insecurity: Not on file   Transportation Needs: Not on file   Physical Activity: Not on file   Stress: Not on file   Social Connections: Not on file   Intimate Partner Violence: Not on file   Housing Stability: Not on file     Current Facility-Administered Medications   Medication Dose Route Frequency Provider Last Rate Last Admin    ondansetron (ZOFRAN) injection 4 mg  4 mg IntraVENous Q1H PRN Alejandra Oneill MD        dicyclomine (BENTYL) capsule 10 mg  10 mg Oral Once Alejandra Oneill MD         Current Outpatient Medications   Medication Sig Dispense Refill    magnesium gluconate (MAGONATE) 500 MG tablet 2 tabs twice a day      b complex vitamins capsule Take 1 capsule by mouth daily      Cholecalciferol (VITAMIN D) 50 MCG (2000 UT) CAPS capsule Take by mouth      Blood Pressure Monitoring (COMFORT TOUCH BP CUFF/MEDIUM) MISC Check BP daily 1 each 0    amLODIPine (NORVASC) 10 MG tablet Take 1 tablet by mouth daily 90 tablet 1    lisinopril (PRINIVIL;ZESTRIL) 40 MG tablet Take 1 tablet by mouth daily 30 tablet 3    hydroCHLOROthiazide (HYDRODIURIL) 25 MG tablet Take 1 tablet by mouth daily 30 tablet 3    ONETOUCH VERIO strip        Allergies   Allergen Reactions    Augmentin [Amoxicillin-Pot Clavulanate]      STOMACH UPSET       REVIEW OF SYSTEMS  10 systems reviewed, pertinent positives per HPI otherwise noted to be negative.     PHYSICAL EXAM  BP (!) 143/76 Pulse (!) 106   Temp 97.7 °F (36.5 °C) (Oral)   Resp 18   Wt 200 lb (90.7 kg)   SpO2 99%   BMI 32.78 kg/m²    GENERAL APPEARANCE: Awake and alert. Cooperative. No distress. HENT: Normocephalic. Atraumatic. Mucous membranes are dry. NECK: Supple. EYES: PERRL. EOM's grossly intact. HEART/CHEST: Reg rhythm, mild tachycardia. No murmurs. No chest wall tenderness. LUNGS: Respirations unlabored. CTAB. Good air exchange. Speaking comfortably in full sentences. ABDOMEN: Moderate umbilical ttp with hernia noted but this was reduced on exam.  No generalized abd ttp elsewhere. Soft. Mildly distended. No masses. No organomegaly. No guarding or rebound. Normal bowel sounds throughout. MUSCULOSKELETAL: No extremity edema. Compartments soft. No deformity. No tenderness in the extremities. All extremities neurovascularly intact. SKIN: Warm and dry. No acute rashes. NEUROLOGICAL: Alert and oriented. CN's 2-12 intact. No gross facial drooping. Strength 5/5, sensation intact. 2 plus DTR's in knees bilaterally. Gait normal.  PSYCHIATRIC: Normal mood and affect. LABS  I have personally reviewed all labs for this visit.    Results for orders placed or performed during the hospital encounter of 03/11/23   Comprehensive Metabolic Panel w/ Reflex to MG   Result Value Ref Range    Sodium 139 136 - 145 mmol/L    Potassium reflex Magnesium 3.8 3.5 - 5.1 mmol/L    Chloride 98 (L) 99 - 110 mmol/L    CO2 30 21 - 32 mmol/L    Anion Gap 11 3 - 16    Glucose 131 (H) 70 - 99 mg/dL    BUN 19 7 - 20 mg/dL    Creatinine 0.8 0.6 - 1.1 mg/dL    Est, Glom Filt Rate >60 >60    Calcium 10.1 8.3 - 10.6 mg/dL    Total Protein 7.2 6.4 - 8.2 g/dL    Albumin 4.0 3.4 - 5.0 g/dL    Albumin/Globulin Ratio 1.3 1.1 - 2.2    Total Bilirubin 0.3 0.0 - 1.0 mg/dL    Alkaline Phosphatase 64 40 - 129 U/L    ALT 22 10 - 40 U/L    AST 27 15 - 37 U/L   Protime-INR   Result Value Ref Range    Protime 12.9 11.7 - 14.5 sec    INR 0.98 0.87 - 1.14 Lipase   Result Value Ref Range    Lipase 12.0 (L) 13.0 - 60.0 U/L   Lactate, Sepsis   Result Value Ref Range    Lactic Acid, Sepsis 1.0 0.4 - 1.9 mmol/L   Urinalysis with Reflex to Culture    Specimen: Urine, clean catch   Result Value Ref Range    Color, UA DARK YELLOW (A) Straw/Yellow    Clarity, UA CLOUDY (A) Clear    Glucose, Ur Negative Negative mg/dL    Bilirubin Urine Negative Negative    Ketones, Urine TRACE (A) Negative mg/dL    Specific Gravity, UA 1.025 1.005 - 1.030    Blood, Urine Negative Negative    pH, UA 5.0 5.0 - 8.0    Protein,  (A) Negative mg/dL    Urobilinogen, Urine 1.0 <2.0 E.U./dL    Nitrite, Urine Negative Negative    Leukocyte Esterase, Urine Negative Negative    Microscopic Examination YES     Urine Type Voided     Urine Reflex to Culture Not Indicated    Microscopic Urinalysis   Result Value Ref Range    Bacteria, UA None Seen None Seen /HPF    Hyaline Casts, UA 43 (H) 0 - 8 /LPF    WBC, UA 1 0 - 5 /HPF    RBC, UA 5 (H) 0 - 4 /HPF    Epithelial Cells, UA 4 0 - 5 /HPF         EKG performed in ED:  None    RADIOLOGY  Any applicable radiology studies including x-ray, CT, MRI, and/or ultrasound, were reviewed independently by me in addition to the radiologist.  I reviewed all radiology images and reports as well from this evaluation. CT ABDOMEN PELVIS WO CONTRAST Additional Contrast? None    Result Date: 3/11/2023  1. Partial small bowel obstruction with a transition point located in the umbilical hernia. The bowel distal to this is decompressed. 2. Status post hysterectomy and cholecystectomy. ED COURSE/MDM  Patient seen and evaluated. Old records reviewed. Labs and imaging reviewed.     After initial evaluation, differential diagnostic considerations included but not limited to: kidney stone, pyelonephritis, UTI, appendicitis, bowel obstruction, diverticulitis, hernia, gastritis/gastroenteritis, pancreatitis, cholecystitis, hepatitis, constipation, IBS, IBD    The patient's ED workup was notable for umbilical hernia on exam with CT findings demonstrating a partial small bowel obstruction with a transition point within the hernia. However lactate is normal and there is no significant fever or hypotension or CT ischemic changes noted to suggest strangulated or incarcerated hernia. Nonetheless it does appear to be the source of the partial SBO. The patient however has not vomited here and I was able to easily reduce the umbilical hernia back within the abdomen and relieve some the patient's symptoms. She was cautioned to let providers know if her hernia were to come out again or she had severe nausea with vomiting but her symptoms improved with this as well as oral Bentyl, IV fluids and IV Zofran. Surgery was consulted as below and we will admit to the hospital.  For now I will hold off on the nasogastric tube given the lack of vomiting and successful manual reduction of the source of the hernia as an alternative means of nonsurgical decompression. Is this patient to be included in the SEP-1 Core Measure? No   Exclusion criteria - the patient is NOT to be included for SEP-1 Core Measure due to: Infection is not suspected      Consults:  Dr. Carole Guillory from general surgery was consulted about the patient's ED history, physical, workup, and course so far. Recommendations from this consultant included agreement with serial abd exams and medical admit for now, no NGT given hernia reduction and no more vomiting afterwards. Surgical timing TBD but non-emergently.     History obtained from: Patient and Family (son)    Pertinent social determinants of health: None applicable    Chronic conditions potentially affecting care:  ovarian CA    Review of other records:  None    Reassessment:  See [facility] for details of medications given and reassessment    During the patient's ED course, the patient was given:  Medications   ondansetron (ZOFRAN) injection 4 mg (0 mg IntraVENous Held 3/11/23 6171)   dicyclomine (BENTYL) capsule 10 mg (0 mg Oral Held 3/11/23 1160)   0.9 % sodium chloride IV bolus 1,000 mL (0 mLs IntraVENous Stopped 3/11/23 4621)        CLINICAL IMPRESSION  1. Partial small bowel obstruction ([address]    2. Umbilical hernia with obstruction, without gangrene        Blood pressure (!) 143/76, pulse (!) 106, temperature 97.7 °F (36.5 °C), temperature source Oral, resp. rate 18, weight 200 lb (90.7 kg), SpO2 99 %. Mat Flores was admitted in fair condition. The plan is to admit to the hospital at this time under the PCP's admitting service. Dr. Shannen Vera accepted the patient and will take over the patient's care. Critical care time:  None    DISCLAIMER: This chart was created using Dragon dictation software. Efforts were made by me to ensure accuracy, however some errors may be present due to limitations of this technology and occasionally words are not transcribed correctly.         Lisa Ward MD  03/11/23 4258

## 2023-03-12 PROBLEM — K43.0 INCARCERATED INCISIONAL HERNIA: Status: ACTIVE | Noted: 2023-03-12

## 2023-03-12 LAB
ANION GAP SERPL CALCULATED.3IONS-SCNC: 7 MMOL/L (ref 3–16)
BASOPHILS ABSOLUTE: 0 K/UL (ref 0–0.2)
BASOPHILS RELATIVE PERCENT: 0.3 %
BUN BLDV-MCNC: 11 MG/DL (ref 7–20)
CALCIUM SERPL-MCNC: 8.8 MG/DL (ref 8.3–10.6)
CHLORIDE BLD-SCNC: 107 MMOL/L (ref 99–110)
CO2: 25 MMOL/L (ref 21–32)
CREAT SERPL-MCNC: 0.6 MG/DL (ref 0.6–1.1)
EOSINOPHILS ABSOLUTE: 0 K/UL (ref 0–0.6)
EOSINOPHILS RELATIVE PERCENT: 0.5 %
GFR SERPL CREATININE-BSD FRML MDRD: >60 ML/MIN/{1.73_M2}
GLUCOSE BLD-MCNC: 90 MG/DL (ref 70–99)
HCT VFR BLD CALC: 32.2 % (ref 36–48)
HEMOGLOBIN: 10.5 G/DL (ref 12–16)
LYMPHOCYTES ABSOLUTE: 1.8 K/UL (ref 1–5.1)
LYMPHOCYTES RELATIVE PERCENT: 43.2 %
MCH RBC QN AUTO: 28.8 PG (ref 26–34)
MCHC RBC AUTO-ENTMCNC: 32.5 G/DL (ref 31–36)
MCV RBC AUTO: 88.6 FL (ref 80–100)
MONOCYTES ABSOLUTE: 0.6 K/UL (ref 0–1.3)
MONOCYTES RELATIVE PERCENT: 14.8 %
NEUTROPHILS ABSOLUTE: 1.7 K/UL (ref 1.7–7.7)
NEUTROPHILS RELATIVE PERCENT: 41.2 %
PDW BLD-RTO: 20 % (ref 12.4–15.4)
PLATELET # BLD: 90 K/UL (ref 135–450)
PMV BLD AUTO: 7 FL (ref 5–10.5)
POTASSIUM REFLEX MAGNESIUM: 4 MMOL/L (ref 3.5–5.1)
RBC # BLD: 3.64 M/UL (ref 4–5.2)
SODIUM BLD-SCNC: 139 MMOL/L (ref 136–145)
WBC # BLD: 4.1 K/UL (ref 4–11)

## 2023-03-12 PROCEDURE — 1200000000 HC SEMI PRIVATE

## 2023-03-12 PROCEDURE — 99231 SBSQ HOSP IP/OBS SF/LOW 25: CPT | Performed by: SURGERY

## 2023-03-12 PROCEDURE — 85025 COMPLETE CBC W/AUTO DIFF WBC: CPT

## 2023-03-12 PROCEDURE — 80048 BASIC METABOLIC PNL TOTAL CA: CPT

## 2023-03-12 PROCEDURE — 6370000000 HC RX 637 (ALT 250 FOR IP): Performed by: INTERNAL MEDICINE

## 2023-03-12 PROCEDURE — 6360000002 HC RX W HCPCS: Performed by: INTERNAL MEDICINE

## 2023-03-12 RX ORDER — LACTOBACILLUS RHAMNOSUS GG 10B CELL
1 CAPSULE ORAL
Status: DISCONTINUED | OUTPATIENT
Start: 2023-03-12 | End: 2023-03-13 | Stop reason: HOSPADM

## 2023-03-12 RX ORDER — CEPHALEXIN 250 MG/1
500 CAPSULE ORAL EVERY 8 HOURS SCHEDULED
Status: DISCONTINUED | OUTPATIENT
Start: 2023-03-12 | End: 2023-03-13 | Stop reason: HOSPADM

## 2023-03-12 RX ADMIN — Medication 1 CAPSULE: at 14:31

## 2023-03-12 RX ADMIN — DICYCLOMINE HYDROCHLORIDE 20 MG: 10 CAPSULE ORAL at 18:46

## 2023-03-12 RX ADMIN — AMLODIPINE BESYLATE 10 MG: 5 TABLET ORAL at 09:30

## 2023-03-12 RX ADMIN — CEPHALEXIN 500 MG: 250 CAPSULE ORAL at 20:24

## 2023-03-12 RX ADMIN — DICYCLOMINE HYDROCHLORIDE 20 MG: 10 CAPSULE ORAL at 06:47

## 2023-03-12 RX ADMIN — ENOXAPARIN SODIUM 40 MG: 100 INJECTION SUBCUTANEOUS at 09:30

## 2023-03-12 RX ADMIN — LISINOPRIL 40 MG: 20 TABLET ORAL at 09:30

## 2023-03-12 RX ADMIN — CEPHALEXIN 500 MG: 250 CAPSULE ORAL at 14:30

## 2023-03-12 ASSESSMENT — PAIN SCALES - GENERAL
PAINLEVEL_OUTOF10: 0
PAINLEVEL_OUTOF10: 0
PAINLEVEL_OUTOF10: 7
PAINLEVEL_OUTOF10: 0

## 2023-03-12 NOTE — PLAN OF CARE
Problem: Pain  Goal: Verbalizes/displays adequate comfort level or baseline comfort level  3/12/2023 0044 by Antonia Sultana RN  Outcome: Progressing  3/11/2023 2235 by Antonia Sultana RN  Outcome: Progressing     Problem: Discharge Planning  Goal: Discharge to home or other facility with appropriate resources  3/11/2023 1127 by Lisa Arriaga RN  Outcome: Progressing

## 2023-03-12 NOTE — PLAN OF CARE
Problem: Discharge Planning  Goal: Discharge to home or other facility with appropriate resources  Outcome: Progressing     Problem: Pain  Goal: Verbalizes/displays adequate comfort level or baseline comfort level  3/12/2023 1012 by Lisa Quiroz RN  Outcome: Progressing  3/12/2023 0044 by Jo Moore RN  Outcome: Progressing  3/11/2023 2235 by Jo Moore RN  Outcome: Progressing

## 2023-03-12 NOTE — PROGRESS NOTES
Shift assessment complete. VSS. Abdomen soft, non-distended, hyperactive bowel sounds. Hernia region soft, non-tender. Denies n/v. Tolerating clear liquid diet. Denies pain. Reports abdominal cramping. PRN Bentyl ordered by Dr. Chang [place] and administered. The care plan and education has been reviewed and mutually agreed upon with the patient. Call light within reach and all needs met at this time.

## 2023-03-12 NOTE — PLAN OF CARE
Problem: Discharge Planning  Goal: Discharge to home or other facility with appropriate resources  3/11/2023 1127 by Lisa Arriaga RN  Outcome: Progressing     Problem: Pain  Goal: Verbalizes/displays adequate comfort level or baseline comfort level  Outcome: Progressing

## 2023-03-12 NOTE — PROGRESS NOTES
Daron Villalobos is a 62 y.o. female patient.     Chief complaint - periumbilical abdominal pain    HPI-62year-old female seen in follow-up for an incarcerated periumbilical hernia      Current Facility-Administered Medications   Medication Dose Route Frequency Provider Last Rate Last Admin    cephALEXin (KEFLEX) capsule 500 mg  500 mg Oral 3 times per day Juanito Haas MD        dicyclomine (BENTYL) capsule 10 mg  10 mg Oral Once Sweetie Macias MD        amLODIPine (NORVASC) tablet 10 mg  10 mg Oral Daily Juanito Haas MD   10 mg at 03/12/23 0930    lisinopril (PRINIVIL;ZESTRIL) tablet 40 mg  40 mg Oral Daily Juanito Haas MD   40 mg at 03/12/23 0930    0.9 % sodium chloride infusion   IntraVENous Continuous Juanito Haas MD   Stopped at 03/11/23 0844    sodium chloride flush 0.9 % injection 5-40 mL  5-40 mL IntraVENous 2 times per day Juanito Haas MD        sodium chloride flush 0.9 % injection 5-40 mL  5-40 mL IntraVENous PRN Juanito Haas MD        0.9 % sodium chloride infusion  25 mL IntraVENous PRN Juanito Haas MD        potassium chloride (KLOR-CON M) extended release tablet 40 mEq  40 mEq Oral PRN Juanito Haas MD        Or    potassium bicarb-citric acid (EFFER-K) effervescent tablet 40 mEq  40 mEq Oral PRN Juanito Haas MD        Or    potassium chloride 10 mEq/100 mL IVPB (Peripheral Line)  10 mEq IntraVENous PRN Juanito Haas MD        enoxaparin (LOVENOX) injection 40 mg  40 mg SubCUTAneous Daily Juanito Haas MD   40 mg at 03/12/23 0930    ondansetron (ZOFRAN-ODT) disintegrating tablet 4 mg  4 mg Oral Q8H PRN Juanito Haas MD        Or    ondansetron [facility]) injection 4 mg  4 mg IntraVENous Q6H PRN Juanito Haas MD        magnesium hydroxide (MILK OF MAGNESIA) 400 MG/5ML suspension 30 mL  30 mL Oral Daily PRN Juanito Haas MD        acetaminophen (TYLENOL) tablet 650 mg  650 mg Oral Q6H PRN Juanito Haas MD        Or    acetaminophen (TYLENOL) suppository 650 mg  650 mg Rectal Q6H PRN Stanley Navarro MD        dicyclomine (BENTYL) capsule 20 mg  20 mg Oral TID PRN Stanley Navarro MD   20 mg at 03/12/23 0553     Allergies   Allergen Reactions    Augmentin [Amoxicillin-Pot Clavulanate]      STOMACH UPSET     Principal Problem:    Partial small bowel obstruction (HCC)  Active Problems:    Umbilical hernia    Anemia    Diabetes mellitus ([address]    Essential hypertension    GERD (gastroesophageal reflux disease)  Resolved Problems:    * No resolved hospital problems. *    Blood pressure 115/71, pulse 99, temperature 98.1 °F (36.7 °C), temperature source Oral, resp. rate 16, height 5' 5.5\" (1.664 m), weight 200 lb (90.7 kg), SpO2 99 %. Subjective:  Symptoms:  Stable. Diet:  Adequate intake. Activity level: Returning to normal.    Pain:  She complains of pain that is mild. Objective:  General Appearance:  Comfortable. Vital signs: (most recent): Blood pressure 115/71, pulse 99, temperature 98.1 °F (36.7 °C), temperature source Oral, resp. rate 16, height 5' 5.5\" (1.664 m), weight 200 lb (90.7 kg), SpO2 99 %. Output: Producing urine. Lungs:  Normal effort and normal respiratory rate. Abdomen: Abdomen is soft and non-distended. (Mild periumbilical tenderness. The hernia contains bowel and reduces easily). Neurological: Patient is alert and oriented to person, place and time. Skin:  Warm and dry. Assessment & Plan pleasant 68-year-old female with stage III ovarian cancer who is seen in follow-up for a periumbilical ventral hernia. The hernia was reduced in the ER 2 days ago. She is tolerating a clear liquid diet. Still having some periumbilical abdominal discomfort and cramping. Will advance to a general diet. May or may not be ready for discharge today. She has an appointment at [facility] this week for chemotherapy.   Recommended that we consider interrupting her chemotherapy at some point in time for elective hernia repair as the hernia is felt to be high risk for strangulation of bowel.     Liseth Saeed MD  3/12/2023

## 2023-03-12 NOTE — DISCHARGE SUMMARY
[facility] -- Physician Discharge Summary     [facility]  1965  MRN: [id number]    Admit Date: 3/11/2023  Discharge Date: No discharge date for patient encounter. Attending MD: Toby John MD  Discharging MD: Toby John MD  PCP: Mark Merino MD [address] [place] [address] Radha Diaz [phone]    Admission Diagnosis: Umbilical hernia with obstruction, without gangrene [K42.0]  Partial small bowel obstruction ([facility] [A09.291]  DISCHARGE DIAGNOSIS: same    Full Hospital Problem List:  Active Hospital Problems    Diagnosis Date Noted    Partial small bowel obstruction ([facility] [address] [M99.178] 03/11/2023     Priority: Medium    Umbilical hernia [N16.6] 03/11/2023     Priority: Medium    Anemia [D64.9] 03/11/2023     Priority: Medium    Diabetes mellitus ([facility] [address] [E11.9]     Essential hypertension [I10]     GERD (gastroesophageal reflux disease) [K21.9]            Hospital Course:  51-year-old female who is currently receiving treatment for stage III ovarian cancer and presents to the emergency room with acute onset of severe (8/10), sharp periumbilical abdominal pain. Nothing made the pain better or worse. Associated symptoms include multiple episodes of nausea and vomiting. She had similar symptoms about 1 month ago which were less severe. No history of fevers, chills or urinary symptoms. CAT scan showed an incarcerated hernia at the umbilicus. The hernia was able to be reduced by Dr. Earlene Onofre. She is monitored, NGT not required  Able to tolerate clear liquid diet next day  Meds go down without issues    Case d/w Surgery, Dr Willie Schmitz:  The patient is at high risk for repeat recurrence of incarceration and possible strangulation of bowel.  has an appointment at [facility] next week. May consider some interruption in her chemotherapy in order to proceed with laparoscopic incisional hernia repair. No plans for immediate repair this hospitalization.     Cleared for home d/c  To see surgery as outpatient, either here or in [address] [place] [address] made during Hospitalization:  [address] TO INTERNAL MEDICINE    Treatment team at time of Discharge: Treatment Team: Attending Provider: Gabriel Ngo MD; Consulting Physician: Luli Jordan MD; Consulting Physician: Gabriel Ngo MD; Utilization Reviewer: Babette Fothergill, LPN; Patient Care Tech: Chacha Howell; Registered Nurse: Lisa Quiroz RN    Imaging Results:  CT ABDOMEN PELVIS WO CONTRAST Additional Contrast? None    Result Date: 3/12/2023  EXAMINATION: CT OF THE ABDOMEN AND PELVIS WITHOUT CONTRAST 3/11/2023 3:21 am TECHNIQUE: CT of the abdomen and pelvis was performed without the administration of intravenous contrast. Multiplanar reformatted images are provided for review. Automated exposure control, iterative reconstruction, and/or weight based adjustment of the mA/kV was utilized to reduce the radiation dose to as low as reasonably achievable. COMPARISON: September 4, 2018 HISTORY: ORDERING SYSTEM PROVIDED HISTORY: abd pain TECHNOLOGIST PROVIDED HISTORY: Reason for exam:->abd pain Additional Contrast?->None Decision Support Exception - unselect if not a suspected or confirmed emergency medical condition->Emergency Medical Condition (MA) Reason for Exam: abd pain Relevant Medical/Surgical History: Abdominal Pain (Pt is on amoxicillin, gets chemo tx, took culturelle today and also takes magnesium, states she is now feeling nauseous and vomiting after her last dose of abx, also c/o abdominal pain. Was told last time she was on abx not to take the RX for magnesium) FINDINGS: Lower Chest: Clear lung bases. Organs: Status post cholecystectomy. No intra or extrahepatic biliary dilatation. The liver parenchyma, spleen, pancreas, adrenal glands, and kidneys demonstrate no acute abnormality. The collecting systems are normal. GI/bowel:  The stomach is distended with fluid but otherwise unremarkable. The small bowel is fluid-filled and distended, measuring up to 3.9 cm. A umbilical hernia containing small bowel is seen. Bowel distal to this is decompressed. Normal appendix. The colon is mostly decompressed. Pelvis: The bladder is mostly decompressed. Status post hysterectomy. No suspicious adnexal masses. Peritoneum/Retroperitoneum: No free fluid or free air. No pathologic lymphadenopathy. Vasculature demonstrates no acute abnormality. Moderate atherosclerosis is noted. Bones/Soft Tissues: No acute or aggressive osseous lesion. 1. Partial small bowel obstruction with a transition point located in the umbilical hernia. The bowel distal to this is decompressed. 2. Status post hysterectomy and cholecystectomy. Discharge Exam:  Constitutional: vitals as above: alert, appears stated age and cooperative    Psychiatric: normal insight and judgment, oriented to person, place, time, and general circumstances    Head: Normocephalic, without obvious abnormality, atraumatic    Eyes:lids and lashes normal, conjunctivae and sclerae normal and pupils equal, round, reactive to light and accomodation    EMNT: external ears normal, nares midline    Neck: no carotid bruit, supple, symmetrical, trachea midline and thyroid not enlarged, symmetric, no tenderness/mass/nodules     Respiratory: clear to auscultation and percussion bilaterally with normal respiratory effort    Cardiovascular: elevated rate, regular rhythm, normal S1 and S2 and no murmurs    Gastrointestinal: soft, non-tender, non-distended, normal bowel sounds, no masses or organomegaly    Extremities: no clubbing, no edema    Skin:No rashes or nodules noted.     Neurologic:negative      Disposition: home    Condition: stable    Discharge Medications:     Medication List        CONTINUE taking these medications      amLODIPine 10 MG tablet  Commonly known as: NORVASC  Take 1 tablet by mouth daily     b complex vitamins capsule     Comfort Touch BP Cuff/Medium Misc  Check BP daily     lisinopril 40 MG tablet  Commonly known as: PRINIVIL;ZESTRIL  Take 1 tablet by mouth daily     magnesium gluconate 500 MG tablet  Commonly known as: MAGONATE     OneTouch Verio strip  Generic drug: blood glucose test strips     vitamin D 50 MCG (2000 UT) Caps capsule            STOP taking these medications      hydroCHLOROthiazide 25 MG tablet  Commonly known as: HYDRODIURIL                 Allergies: Allergies   Allergen Reactions    Augmentin [Amoxicillin-Pot Clavulanate]      STOMACH UPSET       Follow up Instructions: Follow-up with PCP: Merlene Colon MD in 2 wk .       Total time spent on day of discharge including face-to-face visit, examination, documentation, counseling, preparation of discharge plans and followup, and discharge medicine reconciliation and presciptions is 33 minutes    Signed:  Luis Manuel Cruz MD  3/12/2023

## 2023-03-13 VITALS
HEART RATE: 107 BPM | OXYGEN SATURATION: 98 % | TEMPERATURE: 98 F | HEIGHT: 66 IN | DIASTOLIC BLOOD PRESSURE: 78 MMHG | WEIGHT: 200 LBS | SYSTOLIC BLOOD PRESSURE: 116 MMHG | BODY MASS INDEX: 32.14 KG/M2 | RESPIRATION RATE: 18 BRPM

## 2023-03-13 LAB
ANION GAP SERPL CALCULATED.3IONS-SCNC: 5 MMOL/L (ref 3–16)
BASOPHILS ABSOLUTE: 0 K/UL (ref 0–0.2)
BASOPHILS RELATIVE PERCENT: 0.2 %
BUN BLDV-MCNC: 7 MG/DL (ref 7–20)
CALCIUM SERPL-MCNC: 8.9 MG/DL (ref 8.3–10.6)
CHLORIDE BLD-SCNC: 108 MMOL/L (ref 99–110)
CO2: 26 MMOL/L (ref 21–32)
CREAT SERPL-MCNC: 0.6 MG/DL (ref 0.6–1.1)
EOSINOPHILS ABSOLUTE: 0 K/UL (ref 0–0.6)
EOSINOPHILS RELATIVE PERCENT: 0.7 %
GFR SERPL CREATININE-BSD FRML MDRD: >60 ML/MIN/{1.73_M2}
GLUCOSE BLD-MCNC: 85 MG/DL (ref 70–99)
HCT VFR BLD CALC: 27.8 % (ref 36–48)
HEMOGLOBIN: 9.2 G/DL (ref 12–16)
LYMPHOCYTES ABSOLUTE: 1.4 K/UL (ref 1–5.1)
LYMPHOCYTES RELATIVE PERCENT: 49.6 %
MCH RBC QN AUTO: 29.2 PG (ref 26–34)
MCHC RBC AUTO-ENTMCNC: 33.1 G/DL (ref 31–36)
MCV RBC AUTO: 88.3 FL (ref 80–100)
MONOCYTES ABSOLUTE: 0.5 K/UL (ref 0–1.3)
MONOCYTES RELATIVE PERCENT: 15.7 %
NEUTROPHILS ABSOLUTE: 1 K/UL (ref 1.7–7.7)
NEUTROPHILS RELATIVE PERCENT: 33.8 %
PDW BLD-RTO: 19.7 % (ref 12.4–15.4)
PLATELET # BLD: 75 K/UL (ref 135–450)
PMV BLD AUTO: 7.3 FL (ref 5–10.5)
POTASSIUM SERPL-SCNC: 4 MMOL/L (ref 3.5–5.1)
RBC # BLD: 3.15 M/UL (ref 4–5.2)
SODIUM BLD-SCNC: 139 MMOL/L (ref 136–145)
WBC # BLD: 2.9 K/UL (ref 4–11)

## 2023-03-13 PROCEDURE — APPSS15 APP SPLIT SHARED TIME 0-15 MINUTES: Performed by: NURSE PRACTITIONER

## 2023-03-13 PROCEDURE — 85025 COMPLETE CBC W/AUTO DIFF WBC: CPT

## 2023-03-13 PROCEDURE — 80048 BASIC METABOLIC PNL TOTAL CA: CPT

## 2023-03-13 PROCEDURE — APPNB30 APP NON BILLABLE TIME 0-30 MINS: Performed by: NURSE PRACTITIONER

## 2023-03-13 PROCEDURE — 6370000000 HC RX 637 (ALT 250 FOR IP): Performed by: INTERNAL MEDICINE

## 2023-03-13 RX ADMIN — Medication 1 CAPSULE: at 04:33

## 2023-03-13 RX ADMIN — CEPHALEXIN 500 MG: 250 CAPSULE ORAL at 07:26

## 2023-03-13 ASSESSMENT — PAIN SCALES - GENERAL: PAINLEVEL_OUTOF10: 0

## 2023-03-13 NOTE — DISCHARGE INSTRUCTIONS
Follow up Instructions:  - Follow-up with PCP: Patience Chisholm MD in 2 weeks, call to schedule appointment. - Activity as tolerated  - Regular diet  - If you experience reoccurring symptoms, excessive pain or temperatures greater than 101.5, call your doctor or return to the ER.

## 2023-03-13 NOTE — PLAN OF CARE
Problem: Pain  Goal: Verbalizes/displays adequate comfort level or baseline comfort level  3/12/2023 2019 by hSaylee Mchugh, RN  Outcome: Progressing  3/12/2023 1012 by Lisa Billy RN  Outcome: Progressing     Problem: Chronic Conditions and Co-morbidities  Goal: Patient's chronic conditions and co-morbidity symptoms are monitored and maintained or improved  Outcome: Progressing

## 2023-03-13 NOTE — PROGRESS NOTES
[facility] [facility] and Laparoscopic Surgery        Progress Note    Patient Name: Monico Sauceda  MRN: [id number]  YOB: 1965  Date of Evaluation: 3/13/2023    Chief Complaint: Periumbilical abdominal pain    Subjective:  No acute events overnight  Pain improved and controlled  No nausea or vomiting, tolerating regular diet  Passing flatus and stool  Resting in bed at this time      Vital Signs:  Patient Vitals for the past 24 hrs:   BP Temp Temp src Pulse Resp SpO2   23 0430 133/81 97.6 °F (36.4 °C) Oral 94 16 98 %   238 105/70 98 °F (36.7 °C) Oral 97 16 98 %   23 117/72 97.1 °F (36.2 °C) Oral 99 16 100 %      TEMPERATURE HISTORY 24H: Temp (24hrs), Av.6 °F (36.4 °C), Min:97.1 °F (36.2 °C), Max:98 °F (36.7 °C)    BLOOD PRESSURE HISTORY: Systolic (48JGC), ZJI:423 , Min:105 , YYK:798    Diastolic (05YMO), EUC:73, Min:70, Max:84      Intake/Output:  I/O last 3 completed shifts: In: 2015 [P.O.:700; I.V.:1315]  Out: 400 [Urine:400]  No intake/output data recorded.   Drain/tube Output:       Physical Exam:  General: awake, alert, oriented to  person, place, time  Cardiovascular:  regular rate and rhythm and no murmur noted  Lungs: clear to auscultation  Abdomen: soft, non-distended, mild periumbilical tenderness only, bowel sounds present, periumbilical hernia is soft    Labs:  CBC:    Recent Labs     23  0455 23  0654 23  0500   WBC 4.2 4.1 2.9*   HGB 11.4* 10.5* 9.2*   HCT 33.8* 32.2* 27.8*   PLT 82* 90* 75*     BMP:    Recent Labs     23  0455 23  0743 23  0500    139 139   K 3.8 4.0 4.0   CL 98* 107 108   CO2 30 25 26   BUN 19 11 7   CREATININE 0.8 0.6 0.6   GLUCOSE 131* 90 85     Hepatic:    Recent Labs     23  0455   AST 27   ALT 22   BILITOT 0.3   ALKPHOS 64     Amylase:  No results found for: AMYLASE  Lipase:    Lab Results   Component Value Date/Time    LIPASE 12.0 2023 04:55 AM    LIPASE 20.0 2018 02:11 AM    LIPASE 12.0 07/06/2015 12:55 PM      Mag:    Lab Results   Component Value Date/Time    MG 1.6 11/05/2022 11:54 AM     Phos:   No results found for: PHOS   Coags:   Lab Results   Component Value Date/Time    PROTIME 12.9 03/11/2023 04:55 AM    INR 0.98 03/11/2023 04:55 AM       Cultures:  Anaerobic culture  No results found for: LABANAE  Fungus stain  No results found for requested labs within last 30 days. Gram stain  No results found for requested labs within last 30 days. Organism  No results found for: [facility]  Surgical culture  No results found for: CXSURG  Blood culture 1  Results in Past 30 Days  Result Component Current Result Ref Range Previous Result Ref Range   Blood Culture, Routine No Growth to date. Any change in status will be called. (3/11/2023)  Not in Time Range      Blood culture 2  Results in Past 30 Days  Result Component Current Result Ref Range Previous Result Ref Range   Culture, Blood 2 No Growth to date. Any change in status will be called. (3/11/2023)  Not in Time Range      Fecal occult  No results found for requested labs within last 30 days. GI bacterial pathogens by PCR  No results found for requested labs within last 30 days. C. difficile  No results found for requested labs within last 30 days. Urine culture  No results found for: LABURIN    Pathology:  No relevant pathology     Imaging:  I have personally reviewed the following films:    No results found.     Scheduled Meds:   cephALEXin  500 mg Oral 3 times per day    lactobacillus  1 capsule Oral Daily with breakfast    dicyclomine  10 mg Oral Once    amLODIPine  10 mg Oral Daily    lisinopril  40 mg Oral Daily    sodium chloride flush  5-40 mL IntraVENous 2 times per day    enoxaparin  40 mg SubCUTAneous Daily     Continuous Infusions:   sodium chloride Stopped (03/11/23 0844)    sodium chloride       PRN Meds:.sodium chloride flush, sodium chloride, potassium chloride **OR** potassium alternative oral replacement **OR** potassium chloride, ondansetron **OR** ondansetron, magnesium hydroxide, acetaminophen **OR** acetaminophen, dicyclomine      Assessment:  62 y.o. female admitted with   1. Partial small bowel obstruction ([address]    2. Umbilical hernia with obstruction, without gangrene        Periumbilical ventral hernia  Stage III ovarian cancer  Hypertension  Diabetes  Anemia      Plan:  1. Pain controlled/improved, hernia soft, no nausea/vomiting--tolerating regular diet, passing stool, vitals/labs stable; continued supportive care, no plans for surgical intervention this admission  2. Regular diet as tolerated; monitor bowel function  3. Activity as tolerated  4. PRN analgesics and antiemetics--minimizing narcotics as tolerated  5. Management of medical comorbid etiologies per primary team and consulting services  6. Disposition: Okay for discharge home from a surgical perspective; follow up with Dr. Jannet De Paz in 2 weeks to discuss elective hernia repair    EDUCATION:  Educated patient on plan of care and disease process--all questions answered. Plans discussed with patient and nursing. Reviewed and discussed with Dr. Jannet De Paz. Signed:  RADHA Basilio - CNP  3/13/2023 9:51 AM    70-year-old female with stage III ovarian cancer who is seen in follow-up for periumbilical abdominal pain. Patient already discharged prior to my visit. Stable for discharge from NP visit. Has a visit with [facility] and chemotherapy this week. Follow-up with me as an outpatient next week.

## 2023-03-13 NOTE — DISCHARGE SUMMARY
[facility] -- Physician Discharge Summary     Yo Aiken  1965  MRN: [id number]    Admit Date: 3/11/2023  Discharge Date: No discharge date for patient encounter. Attending MD: Scotty Edwards MD  Discharging MD: Scotty Edwards MD  PCP: Darvin Robledo MD [address] [facility] Radha Hudson [phone]    Admission Diagnosis: Umbilical hernia with obstruction, without gangrene [K42.0]  Partial small bowel obstruction ([address] [A40.731]  DISCHARGE DIAGNOSIS: same    Full Hospital Problem List:  Active Hospital Problems    Diagnosis Date Noted    Incarcerated incisional hernia [K43.0] 03/12/2023     Priority: Medium    Partial small bowel obstruction ([address] [K56.600] 03/11/2023     Priority: Medium    Umbilical hernia [G84.1] 03/11/2023     Priority: Medium    Anemia [D64.9] 03/11/2023     Priority: Medium    Diabetes mellitus ([address] [E11.9]     Essential hypertension [I10]     GERD (gastroesophageal reflux disease) [K21.9]            Hospital Course:  59-year-old female who is currently receiving treatment for stage III ovarian cancer and presents to the emergency room with acute onset of severe (8/10), sharp periumbilical abdominal pain. Nothing made the pain better or worse. Associated symptoms include multiple episodes of nausea and vomiting. She had similar symptoms about 1 month ago which were less severe. No history of fevers, chills or urinary symptoms. CAT scan showed an incarcerated hernia at the umbilicus. The hernia was able to be reduced by Dr. Carolyn Machado. She is monitored, NGT not required  Able to tolerate clear liquid diet next day  Meds go down without issues    Case d/w Surgery, Dr Willett Arabia:  The patient is at high risk for repeat recurrence of incarceration and possible strangulation of bowel.  has an appointment at [facility] next week.   May consider some interruption in her chemotherapy in order to proceed with laparoscopic incisional hernia repair. No plans for immediate repair this hospitalization. Cleared for home d/c on 3/13 after she is able to tolerate regular diet  To see surgery as outpatient, either here or in [facility]     Pt is supposed to have appt in [facility] 3/15  She will discuss options with her oncologist at that time    Consults made during Hospitalization:  No Rod    Treatment team at time of Discharge: Treatment Team: Attending Provider: Sultana West MD; Consulting Physician: Yunior Farias MD; Consulting Physician: Sultana West MD; Utilization Reviewer: Bethel Dia LPN; Utilization Reviewer: Tony Barajas, RN; Respiratory Therapist (Night): Priti Galeas RCP; Respiratory Therapist (Day): Marycarmen Suazo RCP; Utilization Reviewer: Giovanni Dasilva, CHAUNCEY; Patient Care Tech: Devan Ko; Registered Nurse: Gerard Multani RN    Imaging Results:  CT ABDOMEN PELVIS WO CONTRAST Additional Contrast? None    Result Date: 3/12/2023  EXAMINATION: CT OF THE ABDOMEN AND PELVIS WITHOUT CONTRAST 3/11/2023 3:21 am TECHNIQUE: CT of the abdomen and pelvis was performed without the administration of intravenous contrast. Multiplanar reformatted images are provided for review. Automated exposure control, iterative reconstruction, and/or weight based adjustment of the mA/kV was utilized to reduce the radiation dose to as low as reasonably achievable.  COMPARISON: September 4, 2018 HISTORY: ORDERING SYSTEM PROVIDED HISTORY: abd pain TECHNOLOGIST PROVIDED HISTORY: Reason for exam:->abd pain Additional Contrast?->None Decision Support Exception - unselect if not a suspected or confirmed emergency medical condition->Emergency Medical Condition (MA) Reason for Exam: abd pain Relevant Medical/Surgical History: Abdominal Pain (Pt is on amoxicillin, gets chemo tx, took culturelle today and also takes magnesium, states she is now feeling nauseous and vomiting after her last dose of abx, also c/o abdominal pain. Was told last time she was on abx not to take the RX for magnesium) FINDINGS: Lower Chest: Clear lung bases. Organs: Status post cholecystectomy. No intra or extrahepatic biliary dilatation. The liver parenchyma, spleen, pancreas, adrenal glands, and kidneys demonstrate no acute abnormality. The collecting systems are normal. GI/bowel: The stomach is distended with fluid but otherwise unremarkable. The small bowel is fluid-filled and distended, measuring up to 3.9 cm. A umbilical hernia containing small bowel is seen. Bowel distal to this is decompressed. Normal appendix. The colon is mostly decompressed. Pelvis: The bladder is mostly decompressed. Status post hysterectomy. No suspicious adnexal masses. Peritoneum/Retroperitoneum: No free fluid or free air. No pathologic lymphadenopathy. Vasculature demonstrates no acute abnormality. Moderate atherosclerosis is noted. Bones/Soft Tissues: No acute or aggressive osseous lesion. 1. Partial small bowel obstruction with a transition point located in the umbilical hernia. The bowel distal to this is decompressed. 2. Status post hysterectomy and cholecystectomy.          Discharge Exam:  Constitutional: vitals as above: alert, appears stated age and cooperative    Psychiatric: normal insight and judgment, oriented to person, place, time, and general circumstances    Head: Normocephalic, without obvious abnormality, atraumatic    Eyes:lids and lashes normal, conjunctivae and sclerae normal and pupils equal, round, reactive to light and accomodation    EMNT: external ears normal, nares midline    Neck: no carotid bruit, supple, symmetrical, trachea midline and thyroid not enlarged, symmetric, no tenderness/mass/nodules     Respiratory: clear to auscultation and percussion bilaterally with normal respiratory effort    Cardiovascular: elevated rate, regular rhythm, normal S1 and S2 and no murmurs    Gastrointestinal: soft, non-tender, non-distended, normal bowel sounds, no masses or organomegaly    Extremities: no clubbing, no edema    Skin:No rashes or nodules noted. Neurologic:negative      Disposition: home    Condition: stable    Discharge Medications:     Medication List        CONTINUE taking these medications      amLODIPine 10 MG tablet  Commonly known as: NORVASC  Take 1 tablet by mouth daily     b complex vitamins capsule     Comfort Touch BP Cuff/Medium Misc  Check BP daily     lisinopril 40 MG tablet  Commonly known as: PRINIVIL;ZESTRIL  Take 1 tablet by mouth daily     magnesium gluconate 500 MG tablet  Commonly known as: MAGONATE     OneTouch Verio strip  Generic drug: blood glucose test strips     vitamin D 50 MCG (2000 UT) Caps capsule            STOP taking these medications      hydroCHLOROthiazide 25 MG tablet  Commonly known as: HYDRODIURIL                 Allergies: Allergies   Allergen Reactions    Augmentin [Amoxicillin-Pot Clavulanate]      STOMACH UPSET       Follow up Instructions: Follow-up with PCP: Merlene Cloon MD in 2 wk .       Total time spent on day of discharge including face-to-face visit, examination, documentation, counseling, preparation of discharge plans and followup, and discharge medicine reconciliation and presciptions is 33 minutes    Signed:  Luis Manuel Cruz MD  3/13/2023

## 2023-03-13 NOTE — CARE COORDINATION
Discharge Planning Note:    Chart reviewed and it appears that patient has minimal needs for discharge at this time. Discussed with patient’s nurse and requested that case management be notified if discharge needs are identified.     - Current discharge plan is for the patient to return home.    - PCP: Jose Veliz MD    Case management will continue to follow progress and update discharge plan as needed.      Risk of Readmission Score: 10%    AMADO Tang RN    [facility]  Phone: [phone]

## 2023-03-14 ENCOUNTER — TELEPHONE (OUTPATIENT)
Dept: SURGERY | Age: 58
End: 2023-03-14

## 2023-03-14 NOTE — TELEPHONE ENCOUNTER
Pt states Dr. Mouna Tierney was going to prescribe Bentyl for her stomach spasms. It was not call into the pharmacy. Can you please send to [address] on [facility] [place].  Please advise [phone]
Spoke to PT and advised PT to contact PCP or GI doctor for Bentyl . PT stated Nurse in hospital told her Frederick March would prescribe that because he was the \"on call doctor\".
Will send to  to send in .
no

## 2023-03-15 LAB
BACTERIA BLD CULT ORG #2: NORMAL
BACTERIA BLD CULT: NORMAL

## 2023-03-16 ENCOUNTER — OFFICE VISIT (OUTPATIENT)
Dept: SURGERY | Age: 58
End: 2023-03-16
Payer: COMMERCIAL

## 2023-03-16 VITALS — WEIGHT: 210 LBS | SYSTOLIC BLOOD PRESSURE: 100 MMHG | BODY MASS INDEX: 34.41 KG/M2 | DIASTOLIC BLOOD PRESSURE: 58 MMHG

## 2023-03-16 DIAGNOSIS — K56.600 PARTIAL SMALL BOWEL OBSTRUCTION (HCC): Primary | ICD-10-CM

## 2023-03-16 PROCEDURE — 3017F COLORECTAL CA SCREEN DOC REV: CPT | Performed by: SURGERY

## 2023-03-16 PROCEDURE — 1036F TOBACCO NON-USER: CPT | Performed by: SURGERY

## 2023-03-16 PROCEDURE — G8484 FLU IMMUNIZE NO ADMIN: HCPCS | Performed by: SURGERY

## 2023-03-16 PROCEDURE — 3074F SYST BP LT 130 MM HG: CPT | Performed by: SURGERY

## 2023-03-16 PROCEDURE — G8417 CALC BMI ABV UP PARAM F/U: HCPCS | Performed by: SURGERY

## 2023-03-16 PROCEDURE — 3078F DIAST BP <80 MM HG: CPT | Performed by: SURGERY

## 2023-03-16 PROCEDURE — 99213 OFFICE O/P EST LOW 20 MIN: CPT | Performed by: SURGERY

## 2023-03-16 PROCEDURE — G8427 DOCREV CUR MEDS BY ELIG CLIN: HCPCS | Performed by: SURGERY

## 2023-03-16 PROCEDURE — 1111F DSCHRG MED/CURRENT MED MERGE: CPT | Performed by: SURGERY

## 2023-03-16 NOTE — LETTER
[address]  [address] [address]  Phone: [phone]  Fax: [phone]    March 17, 2023    Patient: Goldie Michele  MRN:  [id number]  YOB: 1965  Date of Visit: 3/16/2023    Dear Dr. Carleen Pitt,    Thank you for the request for consultation for Brendan Bingham. Below are the relevant portions of my assessment and plan of care. Assessment:  Pleasant 78-year-old female with stage III ovarian cancer who was recently admitted the hospital with a small bowel obstruction secondary to an incarcerated supraumbilical incisional hernia. The hernia was able to be reduced in the ER the patient was ultimately treated conservatively. She was supposed to have chemotherapy at [facility] late last week. [facility] recommended operative repair of the hernia prior to receiving any further chemotherapy. Plan:  Robotic laparoscopic ventral hernia repair with mesh. If you have questions, please do not hesitate to call me. I look forward to following Orlando Malcolm along with you.     Sincerely,    Radha Rojas MD    CC providers:    Dona Pedroza, [address] [place] [address]  Via Fax: [phone]     Craig Gosselin, MD  [address] [place] [address]  [place]ia [place]

## 2023-03-17 NOTE — PROGRESS NOTES
Subjective:      CC-periumbilical incisional hernia    Patient ID:HPI Haider Hwang is a 62 y.o. female seen in follow-up for a periumbilical incisional hernia    HPI    Review of Systems    Objective:   Physical Exam  Constitutional:       Appearance: She is well-developed. HENT:      Head: Normocephalic and atraumatic. Right Ear: External ear normal.      Left Ear: External ear normal.   Eyes:      Conjunctiva/sclera: Conjunctivae normal.   Cardiovascular:      Rate and Rhythm: Normal rate and regular rhythm. Pulmonary:      Effort: Pulmonary effort is normal.      Breath sounds: Normal breath sounds. Abdominal:      General: There is no distension. Palpations: Abdomen is soft. Tenderness: There is abdominal tenderness. Musculoskeletal:         General: Normal range of motion. Cervical back: Normal range of motion and neck supple. Skin:     General: Skin is warm and dry. Neurological:      Mental Status: She is alert and oriented to person, place, and time. Psychiatric:         Behavior: Behavior normal.       Assessment:      Pleasant 59-year-old female with stage III ovarian cancer who was recently admitted the hospital with a small bowel obstruction secondary to an incarcerated supraumbilical incisional hernia. The hernia was able to be reduced in the ER the patient was ultimately treated conservatively. She was supposed to have chemotherapy at [facility] late last week. [facility] recommended operative repair of the hernia prior to receiving any further chemotherapy. Plan:      Robotic laparoscopic ventral hernia repair with mesh. Patient explained risks, benefits and possible complications including risk of bleeding, bowel injury, hernia recurrence, infection requiring mesh removal, erosion of mesh into bowel or nerve entrapment.           Danitza Guerra MD

## 2023-03-28 ENCOUNTER — TELEPHONE (OUTPATIENT)
Dept: ENDOCRINOLOGY | Age: 58
End: 2023-03-28

## 2023-03-30 ENCOUNTER — OFFICE VISIT (OUTPATIENT)
Dept: ENDOCRINOLOGY | Age: 58
End: 2023-03-30
Payer: COMMERCIAL

## 2023-03-30 VITALS
DIASTOLIC BLOOD PRESSURE: 84 MMHG | HEIGHT: 66 IN | HEART RATE: 98 BPM | SYSTOLIC BLOOD PRESSURE: 140 MMHG | WEIGHT: 207 LBS | RESPIRATION RATE: 16 BRPM | BODY MASS INDEX: 33.27 KG/M2

## 2023-03-30 DIAGNOSIS — E11.9 TYPE 2 DIABETES MELLITUS WITHOUT COMPLICATION, WITHOUT LONG-TERM CURRENT USE OF INSULIN (HCC): Primary | ICD-10-CM

## 2023-03-30 DIAGNOSIS — I10 ESSENTIAL HYPERTENSION: ICD-10-CM

## 2023-03-30 DIAGNOSIS — C56.3 MALIGNANT NEOPLASM OF BOTH OVARIES (HCC): ICD-10-CM

## 2023-03-30 PROCEDURE — 3077F SYST BP >= 140 MM HG: CPT | Performed by: INTERNAL MEDICINE

## 2023-03-30 PROCEDURE — 99213 OFFICE O/P EST LOW 20 MIN: CPT | Performed by: INTERNAL MEDICINE

## 2023-03-30 PROCEDURE — 1111F DSCHRG MED/CURRENT MED MERGE: CPT | Performed by: INTERNAL MEDICINE

## 2023-03-30 PROCEDURE — G8427 DOCREV CUR MEDS BY ELIG CLIN: HCPCS | Performed by: INTERNAL MEDICINE

## 2023-03-30 PROCEDURE — 3079F DIAST BP 80-89 MM HG: CPT | Performed by: INTERNAL MEDICINE

## 2023-03-30 PROCEDURE — 2022F DILAT RTA XM EVC RTNOPTHY: CPT | Performed by: INTERNAL MEDICINE

## 2023-03-30 PROCEDURE — 1036F TOBACCO NON-USER: CPT | Performed by: INTERNAL MEDICINE

## 2023-03-30 PROCEDURE — G8417 CALC BMI ABV UP PARAM F/U: HCPCS | Performed by: INTERNAL MEDICINE

## 2023-03-30 PROCEDURE — 3046F HEMOGLOBIN A1C LEVEL >9.0%: CPT | Performed by: INTERNAL MEDICINE

## 2023-03-30 PROCEDURE — G8484 FLU IMMUNIZE NO ADMIN: HCPCS | Performed by: INTERNAL MEDICINE

## 2023-03-30 PROCEDURE — 3017F COLORECTAL CA SCREEN DOC REV: CPT | Performed by: INTERNAL MEDICINE

## 2023-03-30 NOTE — PROGRESS NOTES
Marital status: Single     Spouse name: Not on file    Number of children: Not on file    Years of education: Not on file    Highest education level: Not on file   Occupational History    Not on file   Tobacco Use    Smoking status: Never    Smokeless tobacco: Never   Substance and Sexual Activity    Alcohol use: No     Alcohol/week: 0.0 standard drinks    Drug use: No    Sexual activity: Not on file   Other Topics Concern    Not on file   Social History Narrative    Not on file     Social Determinants of Health     Financial Resource Strain: Not on file   Food Insecurity: Not on file   Transportation Needs: Not on file   Physical Activity: Not on file   Stress: Not on file   Social Connections: Not on file   Intimate Partner Violence: Not on file   Housing Stability: Not on file     Family History   Problem Relation Age of Onset    High Blood Pressure Mother     Heart Disease Father         passed at 61    Cancer Maternal Uncle         breast     Current Outpatient Medications   Medication Sig Dispense Refill    Ascorbic Acid (VITAMIN C PO) Take by mouth      Probiotic Product (PROBIOTIC DAILY PO) Take by mouth      L-METHYLFOLATE PO Take by mouth      magnesium gluconate (MAGONATE) 500 MG tablet 2 tabs twice a day      b complex vitamins capsule Take 1 capsule by mouth daily Liquid b12 (2000) w/ b complex      Cholecalciferol (VITAMIN D) 50 MCG (2000 UT) CAPS capsule Take by mouth      Blood Pressure Monitoring (COMFORT TOUCH BP CUFF/MEDIUM) MISC Check BP daily 1 each 0    amLODIPine (NORVASC) 10 MG tablet Take 1 tablet by mouth daily 90 tablet 1    lisinopril (PRINIVIL;ZESTRIL) 40 MG tablet Take 1 tablet by mouth daily 30 tablet 3    ONETOUCH VERIO strip        No current facility-administered medications for this visit.      Allergies   Allergen Reactions    Augmentin [Amoxicillin-Pot Clavulanate]      STOMACH UPSET     Family Status   Relation Name Status    Mother  Alive    Father      [facility]

## 2023-04-29 DIAGNOSIS — I10 ESSENTIAL HYPERTENSION: Primary | ICD-10-CM

## 2023-05-01 RX ORDER — AMLODIPINE BESYLATE 10 MG/1
TABLET ORAL
Qty: 90 TABLET | Refills: 1 | Status: SHIPPED | OUTPATIENT
Start: 2023-05-01

## 2023-07-18 ENCOUNTER — TELEPHONE (OUTPATIENT)
Dept: ENDOCRINOLOGY | Age: 58
End: 2023-07-18

## 2023-07-18 DIAGNOSIS — I10 ESSENTIAL HYPERTENSION: ICD-10-CM

## 2023-07-18 RX ORDER — LISINOPRIL 40 MG/1
40 TABLET ORAL DAILY
Qty: 90 TABLET | Refills: 1 | Status: SHIPPED | OUTPATIENT
Start: 2023-07-18

## 2023-07-18 NOTE — TELEPHONE ENCOUNTER
Fax from [facility]    90 day refill request for Lisinopril 40mg tab    LOV   3-30-23  FOV   12-14-23

## 2023-08-08 DIAGNOSIS — I10 ESSENTIAL HYPERTENSION: ICD-10-CM

## 2023-08-08 RX ORDER — AMLODIPINE BESYLATE 10 MG/1
TABLET ORAL
Qty: 90 TABLET | Refills: 0 | Status: SHIPPED | OUTPATIENT
Start: 2023-08-08

## 2023-10-28 DIAGNOSIS — I10 ESSENTIAL HYPERTENSION: ICD-10-CM

## 2023-10-30 RX ORDER — AMLODIPINE BESYLATE 10 MG/1
10 TABLET ORAL DAILY
Qty: 90 TABLET | Refills: 0 | Status: SHIPPED | OUTPATIENT
Start: 2023-10-30

## 2023-12-14 ENCOUNTER — OFFICE VISIT (OUTPATIENT)
Dept: ENDOCRINOLOGY | Age: 58
End: 2023-12-14
Payer: COMMERCIAL

## 2023-12-14 VITALS
DIASTOLIC BLOOD PRESSURE: 88 MMHG | SYSTOLIC BLOOD PRESSURE: 139 MMHG | BODY MASS INDEX: 34.55 KG/M2 | HEART RATE: 79 BPM | WEIGHT: 215 LBS | HEIGHT: 66 IN | TEMPERATURE: 98 F | RESPIRATION RATE: 14 BRPM

## 2023-12-14 DIAGNOSIS — E11.9 TYPE 2 DIABETES MELLITUS WITHOUT COMPLICATION, WITHOUT LONG-TERM CURRENT USE OF INSULIN (HCC): Primary | ICD-10-CM

## 2023-12-14 DIAGNOSIS — I10 ESSENTIAL HYPERTENSION: ICD-10-CM

## 2023-12-14 DIAGNOSIS — E66.01 MORBID OBESITY WITH BMI OF 40.0-44.9, ADULT (HCC): ICD-10-CM

## 2023-12-14 LAB — HBA1C MFR BLD: 5.3 %

## 2023-12-14 PROCEDURE — G8417 CALC BMI ABV UP PARAM F/U: HCPCS | Performed by: INTERNAL MEDICINE

## 2023-12-14 PROCEDURE — 83036 HEMOGLOBIN GLYCOSYLATED A1C: CPT | Performed by: INTERNAL MEDICINE

## 2023-12-14 PROCEDURE — 3017F COLORECTAL CA SCREEN DOC REV: CPT | Performed by: INTERNAL MEDICINE

## 2023-12-14 PROCEDURE — 2022F DILAT RTA XM EVC RTNOPTHY: CPT | Performed by: INTERNAL MEDICINE

## 2023-12-14 PROCEDURE — G8427 DOCREV CUR MEDS BY ELIG CLIN: HCPCS | Performed by: INTERNAL MEDICINE

## 2023-12-14 PROCEDURE — G8484 FLU IMMUNIZE NO ADMIN: HCPCS | Performed by: INTERNAL MEDICINE

## 2023-12-14 PROCEDURE — 3074F SYST BP LT 130 MM HG: CPT | Performed by: INTERNAL MEDICINE

## 2023-12-14 PROCEDURE — 1036F TOBACCO NON-USER: CPT | Performed by: INTERNAL MEDICINE

## 2023-12-14 PROCEDURE — 3044F HG A1C LEVEL LT 7.0%: CPT | Performed by: INTERNAL MEDICINE

## 2023-12-14 PROCEDURE — 99214 OFFICE O/P EST MOD 30 MIN: CPT | Performed by: INTERNAL MEDICINE

## 2023-12-14 PROCEDURE — 3078F DIAST BP <80 MM HG: CPT | Performed by: INTERNAL MEDICINE

## 2023-12-14 RX ORDER — TRAMADOL HYDROCHLORIDE 50 MG/1
TABLET ORAL
COMMUNITY

## 2023-12-14 NOTE — PROGRESS NOTES
Lorraine Moe is a 62 y.o. female is seen for the management of diet-controlled type  2 diabetes and hypertension. Lorraine Moe was diagnosed with Diabetes mellitus in 2009  . Aic 9.7 in 2023   Diabetes was diagnosed at routine screening . Lorraine Moe got diabetic education in the past.  Comorbid conditions: Neuropathy  She has been doing Keto diet. She stopped taking Metformin and Glyburide since she lost 20 lbs from 2020 to 2021     She is also diagnosed with stage 3 Ovarian cancer in 2020 she took Chemo and had hysterectomy and oophorectomy.    She also has been diagnosed with tachycardia   She has some neuropathy symptoms in her left leg   She has sleep Apnea   She has hypertension and is on HCTZ , lisinopril and amlodipine     INTERIM     She has hernia repair in 2023 at [facility]   She was having trouble walking after taking Doxycline in 2023 after she has URI       Past Medical History:   Diagnosis Date    Diabetes mellitus ([address])     GERD (gastroesophageal reflux disease)     Hiatal hernia     Hypertension     Malignant neoplasm of both ovaries ([address]) 2021    Morbid obesity with BMI of 40.0-44.9, adult [facility])       Patient Active Problem List   Diagnosis    Diabetes mellitus ([address])    Hiatal hernia    Essential hypertension    Morbid obesity with BMI of 40.0-44.9, adult ([address])    GERD (gastroesophageal reflux disease)    Malignant neoplasm of both ovaries (HCC)    Partial small bowel obstruction ([address])    Umbilical hernia    Anemia    Incarcerated incisional hernia     Past Surgical History:   Procedure Laterality Date     SECTION      1    CHOLECYSTECTOMY      COLONOSCOPY  2018    Colonoscopy up to cecum with biopsy polypectomy of rectum annd snare polypectomy of hepatic flexure    WISDOM TOOTH EXTRACTION       Social History     Socioeconomic History    Marital status: Single     Spouse name: Not on file    Number of children: Not on file    Years of

## 2023-12-26 ENCOUNTER — TELEPHONE (OUTPATIENT)
Dept: ENDOCRINOLOGY | Age: 58
End: 2023-12-26

## 2024-01-30 DIAGNOSIS — I10 ESSENTIAL HYPERTENSION: ICD-10-CM

## 2024-01-30 NOTE — TELEPHONE ENCOUNTER
Requested Prescriptions     Pending Prescriptions Disp Refills    amLODIPine (NORVASC) 10 MG tablet [Pharmacy Med Name: amLODIPine BESYLATE 10 MG TAB] 90 tablet 0     Sig: TAKE 1 TABLET BY MOUTH DAILY     2. Essential hypertension  She is on lisinopril 40 mg + Hctz 25 + amlodipine 5 mg daily     Dr Jones can you verify dosing please

## 2024-01-31 RX ORDER — AMLODIPINE BESYLATE 10 MG/1
10 TABLET ORAL DAILY
Qty: 90 TABLET | Refills: 0 | Status: SHIPPED | OUTPATIENT
Start: 2024-01-31

## 2024-02-02 ENCOUNTER — TELEMEDICINE (OUTPATIENT)
Dept: ENDOCRINOLOGY | Age: 59
End: 2024-02-02

## 2024-02-02 DIAGNOSIS — E11.9 TYPE 2 DIABETES MELLITUS WITHOUT COMPLICATION, WITHOUT LONG-TERM CURRENT USE OF INSULIN (HCC): Primary | ICD-10-CM

## 2024-02-02 NOTE — PROGRESS NOTES
Medical Nutrition Therapy for Diabetes  [facility]    Angy Myers  February 2, 2024    Patient Care Team:  Naren Ivan DO as PCP - General (Family Medicine)  Baltazar Woodward DO as Obstetrician (Obstetrics & Gynecology)  Nessa Sarah MD as Consulting Physician (General Surgery)    Reason for visit: 1. Type 2 diabetes mellitus without complication, without long-term current use of insulin ([facility])     Follow up    Angy Myers, was evaluated through a synchronous (real-time) audio-video encounter. The patient (and/or guardian if applicable) is aware that this is a billable service, which includes applicable co-pays. This virtual visit was conducted with patient's (and/or legal guardian's) consent. Patient identification was verified, and a caregiver was present when appropriate.  The patient was located at Home: [address] [place] [address] [place] [address]  The provider was located at Facility (Login Dept): [facility]  [address]  [address]  [facility] [id number] [phone]        ASSESSMENT/PLAN:   NUTRITION DIAGNOSIS    #1 Problem: Altered Nutrition-Related Laboratory Values (NC-2.2)  Related to: Endocrine/Diabetes   As Evidenced by: Elevated Plasma glucose and/or HgbA1c levels         #2 Problem: Inadequate Carbohydrate Intake  Related to: food and nutrition knowledge deficit regarding controlling blood glucose  As evidenced by: food recall with less than 50 g carbohydrate per day    #3 Problem: Knowledge and Beliefs-NB-3.1                       Food and nutrition deficits    NUTRITION INTERVENTION  Nutrition Prescription: 30-45 grams carbohydrate per meal with protein and non-starch vegetables  15 gram carbohydrate snacks, 3 meals per day.      Diabetes Education/Counseling included:  Carbohydrate control- macronutrient needs reviewed, simple vs complex carbs, Reviewed lean proteins sources including vegetarian protein sources, enouraged to add smart carbs to meals =1/4

## 2024-02-10 ENCOUNTER — OFFICE VISIT (OUTPATIENT)
Age: 59
End: 2024-02-10

## 2024-02-10 VITALS
HEIGHT: 65 IN | DIASTOLIC BLOOD PRESSURE: 88 MMHG | BODY MASS INDEX: 35.32 KG/M2 | HEART RATE: 96 BPM | RESPIRATION RATE: 16 BRPM | OXYGEN SATURATION: 96 % | SYSTOLIC BLOOD PRESSURE: 164 MMHG | TEMPERATURE: 98.1 F | WEIGHT: 212 LBS

## 2024-02-10 DIAGNOSIS — J01.90 ACUTE SINUSITIS, RECURRENCE NOT SPECIFIED, UNSPECIFIED LOCATION: Primary | ICD-10-CM

## 2024-02-10 RX ORDER — PREDNISONE 20 MG/1
20 TABLET ORAL 2 TIMES DAILY
Qty: 10 TABLET | Refills: 0 | Status: SHIPPED | OUTPATIENT
Start: 2024-02-10 | End: 2024-02-15

## 2024-02-10 RX ORDER — AZITHROMYCIN 250 MG/1
250 TABLET, FILM COATED ORAL SEE ADMIN INSTRUCTIONS
Qty: 6 TABLET | Refills: 0 | Status: SHIPPED | OUTPATIENT
Start: 2024-02-10 | End: 2024-02-15

## 2024-02-10 ASSESSMENT — ENCOUNTER SYMPTOMS
SWOLLEN GLANDS: 0
SHORTNESS OF BREATH: 0
SORE THROAT: 0
SINUS PRESSURE: 1
RHINORRHEA: 0
COUGH: 1

## 2024-02-10 NOTE — PROGRESS NOTES
Angy Myers (:  1965) is a 58 y.o. female,New patient, here for evaluation of the following chief complaint(s):  Sinusitis (Nasal congestion/drainage for 1.5 week )      ASSESSMENT/PLAN:  1. Acute sinusitis, recurrence not specified, unspecified location    - azithromycin (ZITHROMAX) 250 MG tablet; Take 1 tablet by mouth See Admin Instructions for 5 days 500mg on day 1 followed by 250mg on days 2 - 5  Dispense: 6 tablet; Refill: 0  - predniSONE (DELTASONE) 20 MG tablet; Take 1 tablet by mouth 2 times daily for 5 days  Dispense: 10 tablet; Refill: 0       No follow-ups on file.    SUBJECTIVE/OBJECTIVE:    History provided by:  Patient  Sinusitis  The current episode started 1 to 4 weeks ago. The problem has been gradually worsening since onset. There has been no fever. Associated symptoms include congestion, coughing (mild), headaches and sinus pressure. Pertinent negatives include no chills, diaphoresis, ear pain, neck pain, shortness of breath, sneezing, sore throat or swollen glands.       Vitals:    02/10/24 1233   BP: (!) 164/88   Site: Right Upper Arm   Position: Sitting   Cuff Size: Large Adult   Pulse: 96   Resp: 16   Temp: 98.1 °F (36.7 °C)   TempSrc: Oral   SpO2: 96%   Weight: 96.2 kg (212 lb)   Height: 1.651 m (5' 5\")       Review of Systems   Constitutional:  Negative for activity change, appetite change, chills and diaphoresis.   HENT:  Positive for congestion and sinus pressure. Negative for ear pain, rhinorrhea, sneezing and sore throat.    Respiratory:  Positive for cough (mild). Negative for shortness of breath.    Musculoskeletal:  Negative for neck pain.   Neurological:  Positive for headaches.       Physical Exam  Constitutional:       General: She is not in acute distress.  HENT:      Nose: Congestion (with sinus tenderness) present. No rhinorrhea.      Mouth/Throat:      Mouth: Mucous membranes are moist.      Pharynx: No posterior oropharyngeal erythema.   Eyes:

## 2024-03-30 DIAGNOSIS — I10 ESSENTIAL HYPERTENSION: ICD-10-CM

## 2024-03-30 DIAGNOSIS — J01.90 ACUTE SINUSITIS, RECURRENCE NOT SPECIFIED, UNSPECIFIED LOCATION: ICD-10-CM

## 2024-04-01 RX ORDER — LISINOPRIL 40 MG/1
40 TABLET ORAL DAILY
Qty: 90 TABLET | Refills: 1 | Status: SHIPPED | OUTPATIENT
Start: 2024-04-01

## 2024-04-08 RX ORDER — PREDNISONE 20 MG/1
TABLET ORAL
Qty: 10 TABLET | Refills: 0 | OUTPATIENT
Start: 2024-04-08

## 2024-07-15 ENCOUNTER — TELEPHONE (OUTPATIENT)
Dept: ENDOCRINOLOGY | Age: 59
End: 2024-07-15

## 2024-07-15 NOTE — TELEPHONE ENCOUNTER
Pt called for a sooner appt. She did have one scheduled in Sept, but I did found one for next week, so she is sched for next week.     Pt is a diabetic pt but was asking about thyroid and thyroid nodules. She will need lab orders expected date updated. Not sure if you want to include thyroid labs on those also?

## 2024-07-25 ENCOUNTER — TELEMEDICINE (OUTPATIENT)
Dept: ENDOCRINOLOGY | Age: 59
End: 2024-07-25
Payer: COMMERCIAL

## 2024-07-25 DIAGNOSIS — I10 ESSENTIAL HYPERTENSION: Primary | ICD-10-CM

## 2024-07-25 DIAGNOSIS — E11.9 TYPE 2 DIABETES MELLITUS WITHOUT COMPLICATION, WITHOUT LONG-TERM CURRENT USE OF INSULIN (HCC): ICD-10-CM

## 2024-07-25 LAB
ALBUMIN: 4.1 G/DL (ref 3.5–5.7)
ALP BLD-CCNC: 69 IU/L (ref 35–135)
ALT SERPL-CCNC: 13 IU/L (ref 10–60)
ANION GAP SERPL CALCULATED.3IONS-SCNC: 6 MMOL/L (ref 4–16)
AST SERPL-CCNC: 22 IU/L (ref 10–40)
BILIRUB SERPL-MCNC: 0.4 MG/DL (ref 0–1.2)
BUN BLDV-MCNC: 32 MG/DL (ref 8–26)
CALCIUM SERPL-MCNC: 9.4 MG/DL (ref 8.5–10.4)
CHLORIDE BLD-SCNC: 104 MEQ/L (ref 98–111)
CHOLESTEROL, TOTAL: 177 MG/DL
CO2: 28 MMOL/L (ref 21–31)
CREAT SERPL-MCNC: 0.86 MG/DL (ref 0.6–1.2)
CREATININE URINE: 66.9 MG/DL
EGFR (CKD-EPI): 78 ML/MIN/1.73 M2
ESTIMATED AVERAGE GLUCOSE: 111 MG/DL
GLUCOSE BLD-MCNC: 78 MG/DL (ref 70–99)
HBA1C MFR BLD: 5.5 % (ref 4.2–5.6)
HDLC SERPL-MCNC: 72 MG/DL
LDL CHOLESTEROL: 95 MG/DL
MICROALBUMIN/CREAT 24H UR: 1031.9 MG/L
MICROALBUMIN/CREAT UR-RTO: 1542 MG/G (ref 0–30)
NONHDLC SERPL-MCNC: 105 MG/DL
POTASSIUM SERPL-SCNC: 4.2 MEQ/L (ref 3.6–5.1)
SODIUM BLD-SCNC: 138 MEQ/L (ref 135–145)
TOTAL PROTEIN: 7 G/DL (ref 6–8)
TRIGL SERPL-MCNC: 49 MG/DL
TSH ULTRASENSITIVE: 2.27 MCIU/ML (ref 0.27–4.2)

## 2024-07-25 PROCEDURE — 2022F DILAT RTA XM EVC RTNOPTHY: CPT | Performed by: INTERNAL MEDICINE

## 2024-07-25 PROCEDURE — G8427 DOCREV CUR MEDS BY ELIG CLIN: HCPCS | Performed by: INTERNAL MEDICINE

## 2024-07-25 PROCEDURE — 3017F COLORECTAL CA SCREEN DOC REV: CPT | Performed by: INTERNAL MEDICINE

## 2024-07-25 PROCEDURE — 99214 OFFICE O/P EST MOD 30 MIN: CPT | Performed by: INTERNAL MEDICINE

## 2024-07-25 PROCEDURE — 3046F HEMOGLOBIN A1C LEVEL >9.0%: CPT | Performed by: INTERNAL MEDICINE

## 2024-07-25 RX ORDER — NIFEDIPINE 60 MG/1
60 TABLET, EXTENDED RELEASE ORAL DAILY
COMMUNITY
Start: 2024-03-07 | End: 2025-03-07

## 2024-07-25 NOTE — PROGRESS NOTES
Angy Myers is a 58 y.o. female is seen for the management of diet-controlled type  2 diabetes and hypertension. Angy Myers was diagnosed with Diabetes mellitus in 2009  . Aic 9.7 in 2023   Diabetes was diagnosed at routine screening . Angy Myers got diabetic education in the past.  Comorbid conditions: Neuropathy  She has been doing Keto diet.  She stopped taking Metformin and Glyburide since she lost 20 lbs from 2020 to 2021     She is also diagnosed with stage 3 Ovarian cancer in 2020 she took Chemo and had hysterectomy and oophorectomy.   She also has been diagnosed with tachycardia   She has some neuropathy symptoms in her left leg   She has sleep Apnea   She has hypertension and is on HCTZ , lisinopril and amlodipine     INTERIM     She has been watching her diet   She was prescribed Jardiance by her nephrologist as a preventive measure due to her chemo induced kidney damage?  But she is reluctant to start any new medicine       Past Medical History:   Diagnosis Date    Diabetes mellitus (HCC)     GERD (gastroesophageal reflux disease)     Hiatal hernia     Hypertension     Malignant neoplasm of both ovaries (HCC) 2021    Morbid obesity with BMI of 40.0-44.9, adult (HCC)       Patient Active Problem List   Diagnosis    Diabetes mellitus (HCC)    Hiatal hernia    Essential hypertension    Morbid obesity with BMI of 40.0-44.9, adult (HCC)    GERD (gastroesophageal reflux disease)    Malignant neoplasm of both ovaries (HCC)    Partial small bowel obstruction (HCC)    Umbilical hernia    Anemia    Incarcerated incisional hernia     Past Surgical History:   Procedure Laterality Date     SECTION      1    CHOLECYSTECTOMY      COLONOSCOPY  2018    Colonoscopy up to cecum with biopsy polypectomy of rectum annd snare polypectomy of hepatic flexure    WISDOM TOOTH EXTRACTION       Social History     Socioeconomic History    Marital status: Single     Spouse name:

## 2024-09-05 ENCOUNTER — TELEPHONE (OUTPATIENT)
Dept: ENDOCRINOLOGY | Age: 59
End: 2024-09-05

## 2024-09-05 DIAGNOSIS — E04.1 THYROID CYST: Primary | ICD-10-CM

## 2024-09-10 ENCOUNTER — HOSPITAL ENCOUNTER (OUTPATIENT)
Dept: ULTRASOUND IMAGING | Age: 59
Discharge: HOME | End: 2024-09-10
Payer: COMMERCIAL

## 2024-09-10 DIAGNOSIS — E04.1 THYROID CYST: ICD-10-CM

## 2024-09-10 PROCEDURE — 76536 US EXAM OF HEAD AND NECK: CPT

## 2024-10-04 DIAGNOSIS — I10 ESSENTIAL HYPERTENSION: ICD-10-CM

## 2024-10-04 RX ORDER — LISINOPRIL 40 MG/1
40 TABLET ORAL DAILY
Qty: 90 TABLET | Refills: 1 | Status: SHIPPED | OUTPATIENT
Start: 2024-10-04

## 2025-01-14 ENCOUNTER — TELEPHONE (OUTPATIENT)
Dept: ENDOCRINOLOGY | Age: 60
End: 2025-01-14

## 2025-01-14 DIAGNOSIS — E11.9 TYPE 2 DIABETES MELLITUS WITHOUT COMPLICATION, WITHOUT LONG-TERM CURRENT USE OF INSULIN (HCC): Primary | ICD-10-CM

## 2025-01-14 NOTE — TELEPHONE ENCOUNTER
Pt calling and states she would like to start testing her sugar. She was in the hospital recently and it was low and they had to give her something to bring up her sugar    Pt states she used Verio brand in the past because it has a /batter. She will also need, lancets, test strips , meter    [facility] in [facility]# [phone]   98.2

## 2025-01-15 RX ORDER — BLOOD-GLUCOSE METER
EACH MISCELLANEOUS
Qty: 1 KIT | Refills: 0 | Status: SHIPPED | OUTPATIENT
Start: 2025-01-15

## 2025-01-15 RX ORDER — LANCETS 30 GAUGE
EACH MISCELLANEOUS
Qty: 100 EACH | Refills: 5 | Status: SHIPPED | OUTPATIENT
Start: 2025-01-15

## 2025-01-15 RX ORDER — BLOOD SUGAR DIAGNOSTIC
STRIP MISCELLANEOUS
Qty: 100 EACH | Refills: 5 | Status: SHIPPED | OUTPATIENT
Start: 2025-01-15

## 2025-02-12 ENCOUNTER — OFFICE VISIT (OUTPATIENT)
Dept: ENDOCRINOLOGY | Age: 60
End: 2025-02-12
Payer: COMMERCIAL

## 2025-02-12 VITALS
BODY MASS INDEX: 29.22 KG/M2 | SYSTOLIC BLOOD PRESSURE: 113 MMHG | TEMPERATURE: 98 F | RESPIRATION RATE: 16 BRPM | DIASTOLIC BLOOD PRESSURE: 64 MMHG | WEIGHT: 175.4 LBS | HEART RATE: 104 BPM | HEIGHT: 65 IN | OXYGEN SATURATION: 98 %

## 2025-02-12 DIAGNOSIS — C56.3 MALIGNANT NEOPLASM OF BOTH OVARIES (HCC): ICD-10-CM

## 2025-02-12 DIAGNOSIS — E11.9 TYPE 2 DIABETES MELLITUS WITHOUT COMPLICATION, WITHOUT LONG-TERM CURRENT USE OF INSULIN (HCC): Primary | ICD-10-CM

## 2025-02-12 DIAGNOSIS — I10 ESSENTIAL HYPERTENSION: ICD-10-CM

## 2025-02-12 LAB — HBA1C MFR BLD: 5.6 %

## 2025-02-12 PROCEDURE — 83036 HEMOGLOBIN GLYCOSYLATED A1C: CPT | Performed by: INTERNAL MEDICINE

## 2025-02-12 PROCEDURE — 2022F DILAT RTA XM EVC RTNOPTHY: CPT | Performed by: INTERNAL MEDICINE

## 2025-02-12 PROCEDURE — 3074F SYST BP LT 130 MM HG: CPT | Performed by: INTERNAL MEDICINE

## 2025-02-12 PROCEDURE — 99214 OFFICE O/P EST MOD 30 MIN: CPT | Performed by: INTERNAL MEDICINE

## 2025-02-12 PROCEDURE — G8427 DOCREV CUR MEDS BY ELIG CLIN: HCPCS | Performed by: INTERNAL MEDICINE

## 2025-02-12 PROCEDURE — G8417 CALC BMI ABV UP PARAM F/U: HCPCS | Performed by: INTERNAL MEDICINE

## 2025-02-12 PROCEDURE — 3017F COLORECTAL CA SCREEN DOC REV: CPT | Performed by: INTERNAL MEDICINE

## 2025-02-12 PROCEDURE — 3078F DIAST BP <80 MM HG: CPT | Performed by: INTERNAL MEDICINE

## 2025-02-12 PROCEDURE — 3044F HG A1C LEVEL LT 7.0%: CPT | Performed by: INTERNAL MEDICINE

## 2025-02-12 PROCEDURE — 1036F TOBACCO NON-USER: CPT | Performed by: INTERNAL MEDICINE

## 2025-02-12 RX ORDER — PROCHLORPERAZINE MALEATE 10 MG
TABLET ORAL
COMMUNITY
Start: 2024-11-05

## 2025-02-12 RX ORDER — PANTOPRAZOLE SODIUM 40 MG/1
40 TABLET, DELAYED RELEASE ORAL DAILY
COMMUNITY
Start: 2024-11-27

## 2025-02-12 RX ORDER — LISINOPRIL 40 MG/1
40 TABLET ORAL DAILY
Qty: 90 TABLET | Refills: 1 | Status: SHIPPED | OUTPATIENT
Start: 2025-02-12

## 2025-02-17 ENCOUNTER — PATIENT MESSAGE (OUTPATIENT)
Dept: ENDOCRINOLOGY | Age: 60
End: 2025-02-17

## 2025-02-17 DIAGNOSIS — E11.9 TYPE 2 DIABETES MELLITUS WITHOUT COMPLICATION, WITHOUT LONG-TERM CURRENT USE OF INSULIN (HCC): ICD-10-CM

## 2025-02-17 RX ORDER — BLOOD-GLUCOSE METER
EACH MISCELLANEOUS
Qty: 1 KIT | Refills: 0 | Status: SHIPPED | OUTPATIENT
Start: 2025-02-17

## 2025-02-18 RX ORDER — BLOOD-GLUCOSE METER
EACH MISCELLANEOUS
Qty: 1 KIT | Refills: 2 | Status: SHIPPED | OUTPATIENT
Start: 2025-02-18 | End: 2025-02-18

## 2025-02-18 RX ORDER — BLOOD-GLUCOSE METER
EACH MISCELLANEOUS
Qty: 1 KIT | Refills: 2 | Status: SHIPPED | OUTPATIENT
Start: 2025-02-18

## 2025-02-18 NOTE — TELEPHONE ENCOUNTER
Please advise on below message:     Can you please send a prescription to [facility] in [facility] to check my vitamin levels

## 2025-03-28 ENCOUNTER — TELEPHONE (OUTPATIENT)
Dept: ENDOCRINOLOGY | Age: 60
End: 2025-03-28

## 2025-03-28 DIAGNOSIS — E11.9 TYPE 2 DIABETES MELLITUS WITHOUT COMPLICATION, WITHOUT LONG-TERM CURRENT USE OF INSULIN: ICD-10-CM

## 2025-03-28 DIAGNOSIS — C56.3 MALIGNANT NEOPLASM OF BOTH OVARIES (HCC): Primary | ICD-10-CM

## 2025-03-28 NOTE — TELEPHONE ENCOUNTER
Pt calling and asking if the Dr can order her a lab to check her Vitamin B12. Pt states because she is doing chemo and feels like her eyes are bothering her and having malabsorption problems because of having her gallbladder removed. Pt states she has a PEG tube so she does not get bowel obstruction    #[phone]

## 2025-07-14 ENCOUNTER — OFFICE VISIT (OUTPATIENT)
Age: 60
End: 2025-07-14

## 2025-07-14 VITALS
HEART RATE: 104 BPM | WEIGHT: 169 LBS | SYSTOLIC BLOOD PRESSURE: 131 MMHG | BODY MASS INDEX: 27.16 KG/M2 | TEMPERATURE: 97.8 F | OXYGEN SATURATION: 98 % | DIASTOLIC BLOOD PRESSURE: 72 MMHG | HEIGHT: 66 IN | RESPIRATION RATE: 18 BRPM

## 2025-07-14 DIAGNOSIS — R53.81 MALAISE AND FATIGUE: Primary | ICD-10-CM

## 2025-07-14 DIAGNOSIS — R53.83 MALAISE AND FATIGUE: Primary | ICD-10-CM

## 2025-07-14 NOTE — PROGRESS NOTES
Angy Myers (:  1965) is a 59 y.o. female,Established patient, here for evaluation of the following chief complaint(s):  Dehydration (Sxs started Saturday. Had chemo therapy on Thursday.)      Assessment & Plan :  Visit Diagnoses and Associated Orders         Malaise and fatigue    -  Primary                  Patient presented today with complaints of possible dehydration and left-sided pain that began approximately 3 days ago. She has a history of ovarian cancer and is currently undergoing chemotherapy. Patient also reported that she takes vitamins to help maintain her magnesium levels, as chemotherapy has caused depletion in the past. On exam, she was noted to have an elevated pulse. Given her oncology history, current chemotherapy, and concern for dehydration along with left-sided pain, it was determined that further evaluation and possible treatment in a higher level of care was warranted. Patient was referred to the emergency room for prompt assessment and management. She verbalized understanding and agreement with this plan. Follow up in 3-5 days if symptoms persist or if symptoms worsen.       Subjective :  HPI     59 y.o. female presents with symptoms of possible dehydration and left sided pain that started 3 days ago.          Vitals:    25 1910   BP: 131/72   BP Site: Right Upper Arm   Patient Position: Sitting   Pulse: (!) 104   Resp: 18   Temp: 97.8 °F (36.6 °C)   TempSrc: Oral   SpO2: 98%   Weight: 76.7 kg (169 lb)   Height: 1.664 m (5' 5.5\")       No results found for this visit on 25.      Objective   Physical Exam  Constitutional:       Appearance: She is ill-appearing.   HENT:      Head: Normocephalic and atraumatic.   Eyes:      Pupils: Pupils are equal, round, and reactive to light.   Cardiovascular:      Rate and Rhythm: Tachycardia present.   Pulmonary:      Effort: Pulmonary effort is normal.      Breath sounds: Normal breath sounds.   Musculoskeletal:         General:

## 2025-07-14 NOTE — PATIENT INSTRUCTIONS
You were seen today for concerns about possible dehydration and left-sided pain that started about 3 days ago. With your history of ovarian cancer and current chemotherapy, along with your elevated pulse today, we believe it’s best for you to be evaluated further in the emergency room.  You were referred to the ER for additional testing and possible treatment to ensure your symptoms are properly addressed.  Please continue taking your vitamins as directed to support your magnesium levels during chemotherapy.  If you notice any worsening pain, new weakness, confusion, dizziness, increased heart rate, trouble breathing, or any other concerning symptoms before you arrive at the ER, please seek emergency care immediately.  If you have any questions after your ER visit or need follow-up, please reach out to our office.

## 2025-08-12 ENCOUNTER — TELEMEDICINE (OUTPATIENT)
Dept: ENDOCRINOLOGY | Age: 60
End: 2025-08-12
Payer: MEDICAID

## 2025-08-12 DIAGNOSIS — E11.9 TYPE 2 DIABETES MELLITUS WITHOUT COMPLICATION, WITHOUT LONG-TERM CURRENT USE OF INSULIN (HCC): Primary | ICD-10-CM

## 2025-08-12 DIAGNOSIS — I10 ESSENTIAL HYPERTENSION: ICD-10-CM

## 2025-08-12 DIAGNOSIS — C56.3 MALIGNANT NEOPLASM OF BOTH OVARIES (HCC): ICD-10-CM

## 2025-08-12 LAB
CHOLESTEROL, TOTAL: 139 MG/DL
ESTIMATED AVERAGE GLUCOSE: 111 MG/DL
HBA1C MFR BLD: 5.5 % (ref 4.2–5.6)
HDLC SERPL-MCNC: 64 MG/DL
LDL CHOLESTEROL: 66 MG/DL
NONHDLC SERPL-MCNC: 75 MG/DL
TRIGL SERPL-MCNC: 47 MG/DL
TSH ULTRASENSITIVE: 0.33 MCIU/ML (ref 0.27–4.2)
VITAMIN B-12: 485 PG/ML (ref 180–914)

## 2025-08-12 PROCEDURE — 99214 OFFICE O/P EST MOD 30 MIN: CPT | Performed by: INTERNAL MEDICINE

## 2025-08-12 RX ORDER — BLOOD SUGAR DIAGNOSTIC
STRIP MISCELLANEOUS
Qty: 100 EACH | Refills: 5 | Status: SHIPPED | OUTPATIENT
Start: 2025-08-12

## 2025-08-12 RX ORDER — DEXAMETHASONE 4 MG/1
4 TABLET ORAL 2 TIMES DAILY WITH MEALS
COMMUNITY

## 2025-08-12 RX ORDER — LISINOPRIL 40 MG/1
40 TABLET ORAL DAILY
Qty: 90 TABLET | Refills: 1 | Status: SHIPPED | OUTPATIENT
Start: 2025-08-12 | End: 2025-08-12 | Stop reason: ALTCHOICE

## 2025-08-13 ENCOUNTER — TELEPHONE (OUTPATIENT)
Dept: ENDOCRINOLOGY | Age: 60
End: 2025-08-13

## 2025-08-14 ENCOUNTER — TELEPHONE (OUTPATIENT)
Dept: ADMINISTRATIVE | Age: 60
End: 2025-08-14